# Patient Record
Sex: MALE | Race: WHITE | HISPANIC OR LATINO | Employment: FULL TIME | URBAN - METROPOLITAN AREA
[De-identification: names, ages, dates, MRNs, and addresses within clinical notes are randomized per-mention and may not be internally consistent; named-entity substitution may affect disease eponyms.]

---

## 2017-11-15 ENCOUNTER — HOSPITAL ENCOUNTER (OUTPATIENT)
Dept: LAB | Facility: MEDICAL CENTER | Age: 52
End: 2017-11-15
Payer: COMMERCIAL

## 2017-11-15 LAB
ALBUMIN SERPL BCP-MCNC: 4.3 G/DL (ref 3.2–4.9)
ALBUMIN/GLOB SERPL: 1.6 G/DL
ALP SERPL-CCNC: 68 U/L (ref 30–99)
ALT SERPL-CCNC: 43 U/L (ref 2–50)
ANION GAP SERPL CALC-SCNC: 10 MMOL/L (ref 0–11.9)
AST SERPL-CCNC: 27 U/L (ref 12–45)
BDY FAT % MEASURED: 21.8 %
BILIRUB SERPL-MCNC: 0.4 MG/DL (ref 0.1–1.5)
BP DIAS: 81 MMHG
BP SYS: 136 MMHG
BUN SERPL-MCNC: 17 MG/DL (ref 8–22)
CALCIUM SERPL-MCNC: 9.3 MG/DL (ref 8.5–10.5)
CHLORIDE SERPL-SCNC: 106 MMOL/L (ref 96–112)
CHOLEST SERPL-MCNC: 199 MG/DL (ref 100–199)
CO2 SERPL-SCNC: 23 MMOL/L (ref 20–33)
CREAT SERPL-MCNC: 0.76 MG/DL (ref 0.5–1.4)
DIABETES HTDIA: NO
EVENT NAME HTEVT: NORMAL
GFR SERPL CREATININE-BSD FRML MDRD: >60 ML/MIN/1.73 M 2
GLOBULIN SER CALC-MCNC: 2.7 G/DL (ref 1.9–3.5)
GLUCOSE SERPL-MCNC: 86 MG/DL (ref 65–99)
HDLC SERPL-MCNC: 49 MG/DL
HYPERTENSION HTHYP: NO
LDLC SERPL CALC-MCNC: 101 MG/DL
POTASSIUM SERPL-SCNC: 3.8 MMOL/L (ref 3.6–5.5)
PROT SERPL-MCNC: 7 G/DL (ref 6–8.2)
SCREENING LOC CITY HTCIT: NORMAL
SCREENING LOC STATE HTSTA: NORMAL
SCREENING LOCATION HTLOC: NORMAL
SODIUM SERPL-SCNC: 139 MMOL/L (ref 135–145)
SUBSCRIBER ID HTSID: NORMAL
TRIGL SERPL-MCNC: 245 MG/DL (ref 0–149)

## 2017-11-15 PROCEDURE — S5190 WELLNESS ASSESSMENT BY NONPH: HCPCS

## 2017-11-15 PROCEDURE — 80061 LIPID PANEL: CPT

## 2017-11-15 PROCEDURE — 36415 COLL VENOUS BLD VENIPUNCTURE: CPT

## 2017-11-15 PROCEDURE — 80053 COMPREHEN METABOLIC PANEL: CPT

## 2019-07-25 ENCOUNTER — HOSPITAL ENCOUNTER (INPATIENT)
Facility: MEDICAL CENTER | Age: 54
LOS: 2 days | DRG: 395 | End: 2019-07-27
Attending: EMERGENCY MEDICINE | Admitting: HOSPITALIST

## 2019-07-25 ENCOUNTER — APPOINTMENT (OUTPATIENT)
Dept: RADIOLOGY | Facility: MEDICAL CENTER | Age: 54
DRG: 395 | End: 2019-07-25
Attending: INTERNAL MEDICINE

## 2019-07-25 ENCOUNTER — APPOINTMENT (OUTPATIENT)
Dept: RADIOLOGY | Facility: MEDICAL CENTER | Age: 54
DRG: 395 | End: 2019-07-25
Attending: HOSPITALIST

## 2019-07-25 DIAGNOSIS — R11.2 NAUSEA VOMITING AND DIARRHEA: ICD-10-CM

## 2019-07-25 DIAGNOSIS — R19.7 NAUSEA VOMITING AND DIARRHEA: ICD-10-CM

## 2019-07-25 DIAGNOSIS — E87.6 HYPOKALEMIA: ICD-10-CM

## 2019-07-25 PROBLEM — R53.1 WEAKNESS: Status: ACTIVE | Noted: 2019-07-25

## 2019-07-25 PROBLEM — R11.10 VOMITING: Status: ACTIVE | Noted: 2019-07-25

## 2019-07-25 PROBLEM — E83.39 HYPOPHOSPHATEMIA: Status: ACTIVE | Noted: 2019-07-25

## 2019-07-25 LAB
ALBUMIN SERPL BCP-MCNC: 4.7 G/DL (ref 3.2–4.9)
ALBUMIN/GLOB SERPL: 1.4 G/DL
ALP SERPL-CCNC: 81 U/L (ref 30–99)
ALT SERPL-CCNC: 33 U/L (ref 2–50)
AMPHET UR QL SCN: NEGATIVE
AMPHET UR QL SCN: NEGATIVE
ANION GAP SERPL CALC-SCNC: 12 MMOL/L (ref 0–11.9)
APPEARANCE UR: CLEAR
AST SERPL-CCNC: 29 U/L (ref 12–45)
BARBITURATES UR QL SCN: NEGATIVE
BARBITURATES UR QL SCN: NEGATIVE
BASOPHILS # BLD AUTO: 0.2 % (ref 0–1.8)
BASOPHILS # BLD: 0.02 K/UL (ref 0–0.12)
BENZODIAZ UR QL SCN: NEGATIVE
BENZODIAZ UR QL SCN: NEGATIVE
BILIRUB SERPL-MCNC: 0.7 MG/DL (ref 0.1–1.5)
BILIRUB UR QL STRIP.AUTO: NEGATIVE
BUN SERPL-MCNC: 15 MG/DL (ref 8–22)
BZE UR QL SCN: NEGATIVE
BZE UR QL SCN: NEGATIVE
CALCIUM SERPL-MCNC: 10 MG/DL (ref 8.5–10.5)
CANNABINOIDS UR QL SCN: NEGATIVE
CANNABINOIDS UR QL SCN: NEGATIVE
CHLORIDE SERPL-SCNC: 105 MMOL/L (ref 96–112)
CK SERPL-CCNC: 227 U/L (ref 0–154)
CO2 SERPL-SCNC: 23 MMOL/L (ref 20–33)
COLOR UR: YELLOW
CREAT SERPL-MCNC: 0.94 MG/DL (ref 0.5–1.4)
EKG IMPRESSION: NORMAL
EOSINOPHIL # BLD AUTO: 0 K/UL (ref 0–0.51)
EOSINOPHIL NFR BLD: 0 % (ref 0–6.9)
ERYTHROCYTE [DISTWIDTH] IN BLOOD BY AUTOMATED COUNT: 39.8 FL (ref 35.9–50)
ETHANOL BLD-MCNC: 0 G/DL
GLOBULIN SER CALC-MCNC: 3.3 G/DL (ref 1.9–3.5)
GLUCOSE SERPL-MCNC: 130 MG/DL (ref 65–99)
GLUCOSE UR STRIP.AUTO-MCNC: NEGATIVE MG/DL
HCT VFR BLD AUTO: 49.8 % (ref 42–52)
HGB BLD-MCNC: 17.2 G/DL (ref 14–18)
IMM GRANULOCYTES # BLD AUTO: 0.01 K/UL (ref 0–0.11)
IMM GRANULOCYTES NFR BLD AUTO: 0.1 % (ref 0–0.9)
KETONES UR STRIP.AUTO-MCNC: ABNORMAL MG/DL
LEUKOCYTE ESTERASE UR QL STRIP.AUTO: NEGATIVE
LIPASE SERPL-CCNC: 21 U/L (ref 11–82)
LYMPHOCYTES # BLD AUTO: 0.69 K/UL (ref 1–4.8)
LYMPHOCYTES NFR BLD: 8.3 % (ref 22–41)
MAGNESIUM SERPL-MCNC: 2 MG/DL (ref 1.5–2.5)
MAGNESIUM SERPL-MCNC: 2.3 MG/DL (ref 1.5–2.5)
MCH RBC QN AUTO: 30 PG (ref 27–33)
MCHC RBC AUTO-ENTMCNC: 34.5 G/DL (ref 33.7–35.3)
MCV RBC AUTO: 86.8 FL (ref 81.4–97.8)
METHADONE UR QL SCN: NEGATIVE
METHADONE UR QL SCN: NEGATIVE
MICRO URNS: ABNORMAL
MONOCYTES # BLD AUTO: 0.27 K/UL (ref 0–0.85)
MONOCYTES NFR BLD AUTO: 3.3 % (ref 0–13.4)
NEUTROPHILS # BLD AUTO: 7.31 K/UL (ref 1.82–7.42)
NEUTROPHILS NFR BLD: 88.1 % (ref 44–72)
NITRITE UR QL STRIP.AUTO: NEGATIVE
NRBC # BLD AUTO: 0 K/UL
NRBC BLD-RTO: 0 /100 WBC
OPIATES UR QL SCN: NEGATIVE
OPIATES UR QL SCN: NEGATIVE
OSMOLALITY SERPL: 298 MOSM/KG H2O (ref 278–298)
OSMOLALITY UR: 554 MOSM/KG H2O (ref 300–900)
OXYCODONE UR QL SCN: NEGATIVE
OXYCODONE UR QL SCN: NEGATIVE
PCP UR QL SCN: NEGATIVE
PCP UR QL SCN: NEGATIVE
PH UR STRIP.AUTO: 6 [PH]
PHOSPHATE SERPL-MCNC: 2.2 MG/DL (ref 2.5–4.5)
PLATELET # BLD AUTO: 240 K/UL (ref 164–446)
PMV BLD AUTO: 10 FL (ref 9–12.9)
POTASSIUM SERPL-SCNC: 1.8 MMOL/L (ref 3.6–5.5)
POTASSIUM SERPL-SCNC: 1.9 MMOL/L (ref 3.6–5.5)
POTASSIUM SERPL-SCNC: 2.3 MMOL/L (ref 3.6–5.5)
POTASSIUM SERPL-SCNC: 2.5 MMOL/L (ref 3.6–5.5)
POTASSIUM SERPL-SCNC: 2.7 MMOL/L (ref 3.6–5.5)
POTASSIUM UR-SCNC: 49.6 MMOL/L
PROPOXYPH UR QL SCN: NEGATIVE
PROPOXYPH UR QL SCN: NEGATIVE
PROT SERPL-MCNC: 8 G/DL (ref 6–8.2)
PROT UR QL STRIP: NEGATIVE MG/DL
RBC # BLD AUTO: 5.74 M/UL (ref 4.7–6.1)
RBC UR QL AUTO: NEGATIVE
SODIUM SERPL-SCNC: 140 MMOL/L (ref 135–145)
SP GR UR STRIP.AUTO: 1.01
UROBILINOGEN UR STRIP.AUTO-MCNC: 0.2 MG/DL
WBC # BLD AUTO: 8.3 K/UL (ref 4.8–10.8)

## 2019-07-25 PROCEDURE — 36556 INSERT NON-TUNNEL CV CATH: CPT | Mod: RT | Performed by: INTERNAL MEDICINE

## 2019-07-25 PROCEDURE — 99292 CRITICAL CARE ADDL 30 MIN: CPT | Mod: 25 | Performed by: INTERNAL MEDICINE

## 2019-07-25 PROCEDURE — 700111 HCHG RX REV CODE 636 W/ 250 OVERRIDE (IP): Performed by: HOSPITALIST

## 2019-07-25 PROCEDURE — 51798 US URINE CAPACITY MEASURE: CPT

## 2019-07-25 PROCEDURE — 700105 HCHG RX REV CODE 258: Performed by: HOSPITALIST

## 2019-07-25 PROCEDURE — C1751 CATH, INF, PER/CENT/MIDLINE: HCPCS

## 2019-07-25 PROCEDURE — 82550 ASSAY OF CK (CPK): CPT

## 2019-07-25 PROCEDURE — 84132 ASSAY OF SERUM POTASSIUM: CPT | Mod: 91

## 2019-07-25 PROCEDURE — 70450 CT HEAD/BRAIN W/O DYE: CPT

## 2019-07-25 PROCEDURE — 83690 ASSAY OF LIPASE: CPT

## 2019-07-25 PROCEDURE — 700105 HCHG RX REV CODE 258: Performed by: EMERGENCY MEDICINE

## 2019-07-25 PROCEDURE — 84100 ASSAY OF PHOSPHORUS: CPT

## 2019-07-25 PROCEDURE — 02HV33Z INSERTION OF INFUSION DEVICE INTO SUPERIOR VENA CAVA, PERCUTANEOUS APPROACH: ICD-10-PCS | Performed by: INTERNAL MEDICINE

## 2019-07-25 PROCEDURE — 700101 HCHG RX REV CODE 250: Performed by: HOSPITALIST

## 2019-07-25 PROCEDURE — A9270 NON-COVERED ITEM OR SERVICE: HCPCS | Performed by: EMERGENCY MEDICINE

## 2019-07-25 PROCEDURE — 99291 CRITICAL CARE FIRST HOUR: CPT | Mod: 25 | Performed by: INTERNAL MEDICINE

## 2019-07-25 PROCEDURE — 99285 EMERGENCY DEPT VISIT HI MDM: CPT

## 2019-07-25 PROCEDURE — 81003 URINALYSIS AUTO W/O SCOPE: CPT

## 2019-07-25 PROCEDURE — 85025 COMPLETE CBC W/AUTO DIFF WBC: CPT

## 2019-07-25 PROCEDURE — 700101 HCHG RX REV CODE 250: Performed by: EMERGENCY MEDICINE

## 2019-07-25 PROCEDURE — 99222 1ST HOSP IP/OBS MODERATE 55: CPT | Performed by: HOSPITALIST

## 2019-07-25 PROCEDURE — 96375 TX/PRO/DX INJ NEW DRUG ADDON: CPT

## 2019-07-25 PROCEDURE — 83735 ASSAY OF MAGNESIUM: CPT

## 2019-07-25 PROCEDURE — 700101 HCHG RX REV CODE 250: Performed by: INTERNAL MEDICINE

## 2019-07-25 PROCEDURE — 96365 THER/PROPH/DIAG IV INF INIT: CPT

## 2019-07-25 PROCEDURE — 700102 HCHG RX REV CODE 250 W/ 637 OVERRIDE(OP): Performed by: EMERGENCY MEDICINE

## 2019-07-25 PROCEDURE — 700102 HCHG RX REV CODE 250 W/ 637 OVERRIDE(OP): Performed by: HOSPITALIST

## 2019-07-25 PROCEDURE — 93005 ELECTROCARDIOGRAM TRACING: CPT | Performed by: HOSPITALIST

## 2019-07-25 PROCEDURE — 36556 INSERT NON-TUNNEL CV CATH: CPT

## 2019-07-25 PROCEDURE — 700105 HCHG RX REV CODE 258: Performed by: INTERNAL MEDICINE

## 2019-07-25 PROCEDURE — 71045 X-RAY EXAM CHEST 1 VIEW: CPT

## 2019-07-25 PROCEDURE — 93010 ELECTROCARDIOGRAM REPORT: CPT | Performed by: INTERNAL MEDICINE

## 2019-07-25 PROCEDURE — 80307 DRUG TEST PRSMV CHEM ANLYZR: CPT

## 2019-07-25 PROCEDURE — 96366 THER/PROPH/DIAG IV INF ADDON: CPT

## 2019-07-25 PROCEDURE — 83935 ASSAY OF URINE OSMOLALITY: CPT

## 2019-07-25 PROCEDURE — 770022 HCHG ROOM/CARE - ICU (200)

## 2019-07-25 PROCEDURE — 700111 HCHG RX REV CODE 636 W/ 250 OVERRIDE (IP): Performed by: INTERNAL MEDICINE

## 2019-07-25 PROCEDURE — 80053 COMPREHEN METABOLIC PANEL: CPT

## 2019-07-25 PROCEDURE — B548ZZA ULTRASONOGRAPHY OF SUPERIOR VENA CAVA, GUIDANCE: ICD-10-PCS | Performed by: INTERNAL MEDICINE

## 2019-07-25 PROCEDURE — A9270 NON-COVERED ITEM OR SERVICE: HCPCS | Performed by: HOSPITALIST

## 2019-07-25 PROCEDURE — 96372 THER/PROPH/DIAG INJ SC/IM: CPT

## 2019-07-25 PROCEDURE — 96367 TX/PROPH/DG ADDL SEQ IV INF: CPT

## 2019-07-25 PROCEDURE — 83930 ASSAY OF BLOOD OSMOLALITY: CPT

## 2019-07-25 PROCEDURE — 36415 COLL VENOUS BLD VENIPUNCTURE: CPT

## 2019-07-25 PROCEDURE — 84133 ASSAY OF URINE POTASSIUM: CPT

## 2019-07-25 PROCEDURE — 700111 HCHG RX REV CODE 636 W/ 250 OVERRIDE (IP): Performed by: EMERGENCY MEDICINE

## 2019-07-25 RX ORDER — ACETAMINOPHEN 325 MG/1
650 TABLET ORAL EVERY 6 HOURS PRN
Status: DISCONTINUED | OUTPATIENT
Start: 2019-07-25 | End: 2019-07-27 | Stop reason: HOSPADM

## 2019-07-25 RX ORDER — OXYCODONE HYDROCHLORIDE 5 MG/1
2.5 TABLET ORAL
Status: DISCONTINUED | OUTPATIENT
Start: 2019-07-25 | End: 2019-07-27 | Stop reason: HOSPADM

## 2019-07-25 RX ORDER — SODIUM CHLORIDE 9 MG/ML
INJECTION, SOLUTION INTRAVENOUS CONTINUOUS
Status: DISCONTINUED | OUTPATIENT
Start: 2019-07-25 | End: 2019-07-25

## 2019-07-25 RX ORDER — POTASSIUM CHLORIDE 20 MEQ/1
40 TABLET, EXTENDED RELEASE ORAL ONCE
Status: COMPLETED | OUTPATIENT
Start: 2019-07-25 | End: 2019-07-25

## 2019-07-25 RX ORDER — POTASSIUM CHLORIDE 7.45 MG/ML
10 INJECTION INTRAVENOUS
Status: DISCONTINUED | OUTPATIENT
Start: 2019-07-25 | End: 2019-07-25

## 2019-07-25 RX ORDER — ONDANSETRON 2 MG/ML
4 INJECTION INTRAMUSCULAR; INTRAVENOUS EVERY 4 HOURS PRN
Status: DISCONTINUED | OUTPATIENT
Start: 2019-07-25 | End: 2019-07-27 | Stop reason: HOSPADM

## 2019-07-25 RX ORDER — POTASSIUM CHLORIDE 29.8 MG/ML
40 INJECTION INTRAVENOUS ONCE
Status: COMPLETED | OUTPATIENT
Start: 2019-07-25 | End: 2019-07-26

## 2019-07-25 RX ORDER — POTASSIUM CHLORIDE 14.9 MG/ML
20 INJECTION INTRAVENOUS ONCE
Status: COMPLETED | OUTPATIENT
Start: 2019-07-25 | End: 2019-07-25

## 2019-07-25 RX ORDER — POLYETHYLENE GLYCOL 3350 17 G/17G
1 POWDER, FOR SOLUTION ORAL
Status: DISCONTINUED | OUTPATIENT
Start: 2019-07-25 | End: 2019-07-25

## 2019-07-25 RX ORDER — HYDROMORPHONE HYDROCHLORIDE 1 MG/ML
0.5 INJECTION, SOLUTION INTRAMUSCULAR; INTRAVENOUS; SUBCUTANEOUS ONCE
Status: COMPLETED | OUTPATIENT
Start: 2019-07-25 | End: 2019-07-25

## 2019-07-25 RX ORDER — POTASSIUM CHLORIDE 7.45 MG/ML
10 INJECTION INTRAVENOUS ONCE
Status: COMPLETED | OUTPATIENT
Start: 2019-07-25 | End: 2019-07-25

## 2019-07-25 RX ORDER — SODIUM CHLORIDE AND POTASSIUM CHLORIDE 300; 900 MG/100ML; MG/100ML
INJECTION, SOLUTION INTRAVENOUS CONTINUOUS
Status: DISCONTINUED | OUTPATIENT
Start: 2019-07-25 | End: 2019-07-25

## 2019-07-25 RX ORDER — AMOXICILLIN 250 MG
2 CAPSULE ORAL 2 TIMES DAILY
Status: DISCONTINUED | OUTPATIENT
Start: 2019-07-25 | End: 2019-07-25

## 2019-07-25 RX ORDER — SODIUM CHLORIDE, SODIUM LACTATE, POTASSIUM CHLORIDE, CALCIUM CHLORIDE 600; 310; 30; 20 MG/100ML; MG/100ML; MG/100ML; MG/100ML
INJECTION, SOLUTION INTRAVENOUS CONTINUOUS
Status: DISCONTINUED | OUTPATIENT
Start: 2019-07-25 | End: 2019-07-26

## 2019-07-25 RX ORDER — CLONIDINE HYDROCHLORIDE 0.1 MG/1
0.1 TABLET ORAL EVERY 6 HOURS PRN
Status: DISCONTINUED | OUTPATIENT
Start: 2019-07-25 | End: 2019-07-27 | Stop reason: HOSPADM

## 2019-07-25 RX ORDER — MORPHINE SULFATE 4 MG/ML
2 INJECTION, SOLUTION INTRAMUSCULAR; INTRAVENOUS
Status: DISCONTINUED | OUTPATIENT
Start: 2019-07-25 | End: 2019-07-27 | Stop reason: HOSPADM

## 2019-07-25 RX ORDER — PROMETHAZINE HYDROCHLORIDE 25 MG/1
12.5-25 TABLET ORAL EVERY 4 HOURS PRN
Status: DISCONTINUED | OUTPATIENT
Start: 2019-07-25 | End: 2019-07-27 | Stop reason: HOSPADM

## 2019-07-25 RX ORDER — ONDANSETRON 4 MG/1
4 TABLET, ORALLY DISINTEGRATING ORAL EVERY 4 HOURS PRN
Status: DISCONTINUED | OUTPATIENT
Start: 2019-07-25 | End: 2019-07-27 | Stop reason: HOSPADM

## 2019-07-25 RX ORDER — ONDANSETRON 2 MG/ML
4 INJECTION INTRAMUSCULAR; INTRAVENOUS ONCE
Status: COMPLETED | OUTPATIENT
Start: 2019-07-25 | End: 2019-07-25

## 2019-07-25 RX ORDER — OXYCODONE HYDROCHLORIDE 5 MG/1
5 TABLET ORAL
Status: DISCONTINUED | OUTPATIENT
Start: 2019-07-25 | End: 2019-07-27 | Stop reason: HOSPADM

## 2019-07-25 RX ORDER — PROMETHAZINE HYDROCHLORIDE 25 MG/1
12.5-25 SUPPOSITORY RECTAL EVERY 4 HOURS PRN
Status: DISCONTINUED | OUTPATIENT
Start: 2019-07-25 | End: 2019-07-27 | Stop reason: HOSPADM

## 2019-07-25 RX ORDER — BISACODYL 10 MG
10 SUPPOSITORY, RECTAL RECTAL
Status: DISCONTINUED | OUTPATIENT
Start: 2019-07-25 | End: 2019-07-25

## 2019-07-25 RX ORDER — SODIUM CHLORIDE, SODIUM LACTATE, POTASSIUM CHLORIDE, CALCIUM CHLORIDE 600; 310; 30; 20 MG/100ML; MG/100ML; MG/100ML; MG/100ML
INJECTION, SOLUTION INTRAVENOUS CONTINUOUS
Status: DISCONTINUED | OUTPATIENT
Start: 2019-07-25 | End: 2019-07-25

## 2019-07-25 RX ORDER — SODIUM CHLORIDE 9 MG/ML
1000 INJECTION, SOLUTION INTRAVENOUS ONCE
Status: COMPLETED | OUTPATIENT
Start: 2019-07-25 | End: 2019-07-25

## 2019-07-25 RX ORDER — POTASSIUM CHLORIDE 7.45 MG/ML
10 INJECTION INTRAVENOUS
Status: COMPLETED | OUTPATIENT
Start: 2019-07-25 | End: 2019-07-25

## 2019-07-25 RX ADMIN — POTASSIUM CHLORIDE 10 MEQ: 7.46 INJECTION, SOLUTION INTRAVENOUS at 07:20

## 2019-07-25 RX ADMIN — POTASSIUM CHLORIDE AND SODIUM CHLORIDE: 900; 300 INJECTION, SOLUTION INTRAVENOUS at 14:36

## 2019-07-25 RX ADMIN — POTASSIUM CHLORIDE 10 MEQ: 7.46 INJECTION, SOLUTION INTRAVENOUS at 09:17

## 2019-07-25 RX ADMIN — POTASSIUM CHLORIDE 10 MEQ: 7.46 INJECTION, SOLUTION INTRAVENOUS at 10:35

## 2019-07-25 RX ADMIN — POTASSIUM CHLORIDE 20 MEQ: 200 INJECTION, SOLUTION INTRAVENOUS at 17:49

## 2019-07-25 RX ADMIN — POTASSIUM CHLORIDE 10 MEQ: 7.46 INJECTION, SOLUTION INTRAVENOUS at 16:45

## 2019-07-25 RX ADMIN — SODIUM CHLORIDE, POTASSIUM CHLORIDE, SODIUM LACTATE AND CALCIUM CHLORIDE: 600; 310; 30; 20 INJECTION, SOLUTION INTRAVENOUS at 07:28

## 2019-07-25 RX ADMIN — POTASSIUM CHLORIDE 10 MEQ: 7.46 INJECTION, SOLUTION INTRAVENOUS at 15:44

## 2019-07-25 RX ADMIN — SODIUM CHLORIDE 1000 ML: 9 INJECTION, SOLUTION INTRAVENOUS at 04:46

## 2019-07-25 RX ADMIN — POTASSIUM CHLORIDE 40 MEQ: 1500 TABLET, EXTENDED RELEASE ORAL at 08:16

## 2019-07-25 RX ADMIN — SODIUM CHLORIDE: 9 INJECTION, SOLUTION INTRAVENOUS at 08:17

## 2019-07-25 RX ADMIN — POTASSIUM CHLORIDE 10 MEQ: 7.46 INJECTION, SOLUTION INTRAVENOUS at 08:16

## 2019-07-25 RX ADMIN — HYDROMORPHONE HYDROCHLORIDE 0.5 MG: 1 INJECTION, SOLUTION INTRAMUSCULAR; INTRAVENOUS; SUBCUTANEOUS at 06:11

## 2019-07-25 RX ADMIN — POTASSIUM PHOSPHATE, MONOBASIC AND POTASSIUM PHOSPHATE, DIBASIC 15 MMOL: 224; 236 INJECTION, SOLUTION, CONCENTRATE INTRAVENOUS at 19:24

## 2019-07-25 RX ADMIN — ENOXAPARIN SODIUM 40 MG: 100 INJECTION SUBCUTANEOUS at 07:20

## 2019-07-25 RX ADMIN — POTASSIUM CHLORIDE 10 MEQ: 7.46 INJECTION, SOLUTION INTRAVENOUS at 14:35

## 2019-07-25 RX ADMIN — POTASSIUM CHLORIDE 40 MEQ: 29.8 INJECTION, SOLUTION INTRAVENOUS at 20:23

## 2019-07-25 RX ADMIN — ONDANSETRON 4 MG: 2 INJECTION INTRAMUSCULAR; INTRAVENOUS at 04:40

## 2019-07-25 RX ADMIN — POTASSIUM CHLORIDE AND SODIUM CHLORIDE: 900; 300 INJECTION, SOLUTION INTRAVENOUS at 05:17

## 2019-07-25 RX ADMIN — POTASSIUM CHLORIDE 40 MEQ: 1500 TABLET, EXTENDED RELEASE ORAL at 05:14

## 2019-07-25 RX ADMIN — SODIUM CHLORIDE, POTASSIUM CHLORIDE, SODIUM LACTATE AND CALCIUM CHLORIDE: 600; 310; 30; 20 INJECTION, SOLUTION INTRAVENOUS at 19:14

## 2019-07-25 ASSESSMENT — ENCOUNTER SYMPTOMS
CONSTITUTIONAL NEGATIVE: 1
STRIDOR: 0
FEVER: 0
PALPITATIONS: 0
PHOTOPHOBIA: 0
HEMOPTYSIS: 0
WEAKNESS: 1
COUGH: 0
CARDIOVASCULAR NEGATIVE: 1
HALLUCINATIONS: 0
EYES NEGATIVE: 1
HEARTBURN: 0
SINUS PAIN: 0
MUSCULOSKELETAL NEGATIVE: 1
DEPRESSION: 0
ORTHOPNEA: 0
DIARRHEA: 1
DIZZINESS: 0
NERVOUS/ANXIOUS: 0
MYALGIAS: 0
LOSS OF CONSCIOUSNESS: 0
FOCAL WEAKNESS: 1
NECK PAIN: 0
VOMITING: 1
PSYCHIATRIC NEGATIVE: 1
RESPIRATORY NEGATIVE: 1
SENSORY CHANGE: 1
BRUISES/BLEEDS EASILY: 0
HEADACHES: 0
NAUSEA: 1
BACK PAIN: 0
CHILLS: 0
BLOOD IN STOOL: 0
DOUBLE VISION: 0
BLURRED VISION: 0
VOMITING: 0
NAUSEA: 0
DIAPHORESIS: 0
SHORTNESS OF BREATH: 1
SEIZURES: 0
TINGLING: 0

## 2019-07-25 ASSESSMENT — COGNITIVE AND FUNCTIONAL STATUS - GENERAL
DAILY ACTIVITIY SCORE: 6
SUGGESTED CMS G CODE MODIFIER DAILY ACTIVITY: CN
WALKING IN HOSPITAL ROOM: TOTAL
TURNING FROM BACK TO SIDE WHILE IN FLAT BAD: UNABLE
SUGGESTED CMS G CODE MODIFIER MOBILITY: CN
EATING MEALS: TOTAL
DRESSING REGULAR LOWER BODY CLOTHING: TOTAL
HELP NEEDED FOR BATHING: TOTAL
MOVING TO AND FROM BED TO CHAIR: UNABLE
MOBILITY SCORE: 6
STANDING UP FROM CHAIR USING ARMS: TOTAL
CLIMB 3 TO 5 STEPS WITH RAILING: TOTAL
DRESSING REGULAR UPPER BODY CLOTHING: TOTAL
MOVING FROM LYING ON BACK TO SITTING ON SIDE OF FLAT BED: UNABLE
PERSONAL GROOMING: TOTAL
TOILETING: TOTAL

## 2019-07-25 ASSESSMENT — PATIENT HEALTH QUESTIONNAIRE - PHQ9
SUM OF ALL RESPONSES TO PHQ9 QUESTIONS 1 AND 2: 0
1. LITTLE INTEREST OR PLEASURE IN DOING THINGS: NOT AT ALL
2. FEELING DOWN, DEPRESSED, IRRITABLE, OR HOPELESS: NOT AT ALL
2. FEELING DOWN, DEPRESSED, IRRITABLE, OR HOPELESS: NOT AT ALL
SUM OF ALL RESPONSES TO PHQ9 QUESTIONS 1 AND 2: 0
1. LITTLE INTEREST OR PLEASURE IN DOING THINGS: NOT AT ALL

## 2019-07-25 ASSESSMENT — LIFESTYLE VARIABLES
TOTAL SCORE: 1
ALCOHOL_USE: YES
SUBSTANCE_ABUSE: 0
EVER_SMOKED: YES
ON A TYPICAL DAY WHEN YOU DRINK ALCOHOL HOW MANY DRINKS DO YOU HAVE: 3
HOW MANY TIMES IN THE PAST YEAR HAVE YOU HAD 5 OR MORE DRINKS IN A DAY: 5
EVER FELT BAD OR GUILTY ABOUT YOUR DRINKING: NO
TOTAL SCORE: 1
HAVE PEOPLE ANNOYED YOU BY CRITICIZING YOUR DRINKING: NO
TOTAL SCORE: 1
HAVE YOU EVER FELT YOU SHOULD CUT DOWN ON YOUR DRINKING: NO
EVER HAD A DRINK FIRST THING IN THE MORNING TO STEADY YOUR NERVES TO GET RID OF A HANGOVER: YES
AVERAGE NUMBER OF DAYS PER WEEK YOU HAVE A DRINK CONTAINING ALCOHOL: 2
CONSUMPTION TOTAL: POSITIVE

## 2019-07-25 ASSESSMENT — COPD QUESTIONNAIRES
DURING THE PAST 4 WEEKS HOW MUCH DID YOU FEEL SHORT OF BREATH: NONE/LITTLE OF THE TIME
HAVE YOU SMOKED AT LEAST 100 CIGARETTES IN YOUR ENTIRE LIFE: YES
DO YOU EVER COUGH UP ANY MUCUS OR PHLEGM?: NO/ONLY WITH OCCASIONAL COLDS OR INFECTIONS
COPD SCREENING SCORE: 3
IN THE PAST 12 MONTHS DO YOU DO LESS THAN YOU USED TO BECAUSE OF YOUR BREATHING PROBLEMS: DISAGREE/UNSURE

## 2019-07-25 NOTE — PROGRESS NOTES
2 RN skin check complete with Rey CORDERO.   Devices in place none.  Skin assessed under devices n/a.  Confirmed pressure ulcers found on n/a.  New potential pressure ulcers noted on none. Wound consult placed n/a.  The following interventions in place frequent repositioning, pillows in place to float heels/elbows.     Generalized dryness. Otherwise skin intact.

## 2019-07-25 NOTE — ED TRIAGE NOTES
"Francisco Mahoney   53 y.o. male   Chief Complaint   Patient presents with   • Nausea/Vomiting/Diarrhea     drank some instant coffe last night around 2100 and began to have N/V/D within an hour, says he has had multiple episodes of each all night and now is having abd pain      Pt to triage in WC with wife for above complaint. Pt says he is now too weak from being dehydrated to be able to walk. Visibly uncomfortable.     Pt is alert and oriented, speaking in full sentences, follows commands and responds appropriately to questions. Resp are even and unlabored.   Pt placed in lobby. Pt educated on triage process. Pt encouraged to alert staff for any changes.    /80   Pulse 61   Temp 36.2 °C (97.1 °F) (Temporal)   Resp 18   Ht 1.753 m (5' 9\")   Wt 83.9 kg (185 lb)   SpO2 98%   BMI 27.32 kg/m²     "

## 2019-07-25 NOTE — PROGRESS NOTES
Called spouse to question about patient mobility. Spouse stated patient was able to walk but slowly and did c/o arm weakness but was able to move arms.

## 2019-07-25 NOTE — H&P
Hospital Medicine History & Physical Note    Date of Service  7/25/2019    Primary Care Physician  Pcp Pt States None    Consultants  None    Code Status  Full code    Chief Complaint  Vomiting and diarrhea    History of Presenting Illness  53 y.o. male with no prior medical history, was in his usual state of health until 7 PM on the day prior to admission.  He reports having eggs with meat.  He felt well after this with no issue, and then drank a packet of strong coffee.  He states that it may not have been coffee however, because approximately 15 minutes after ingesting it, he began to have severe nausea, associated with multiple bouts of vomiting, followed by multiple bouts of diarrhea.  He denies any significant abdominal pain, but continues to have both nausea, vomiting and diarrhea.  He denies fever or chills, chest pain or shortness of breath.  After multiple episodes of nausea and vomiting, as well as diarrhea, he began to have tingling of his upper and lower extremities as well as weakness of his upper extremities.    Review of Systems  Review of Systems   Constitutional: Negative.    HENT: Negative.    Eyes: Negative.    Respiratory: Negative.    Cardiovascular: Negative.    Gastrointestinal: Positive for diarrhea, nausea and vomiting.   Genitourinary: Negative.    Musculoskeletal: Negative.    Skin: Negative.    Neurological: Positive for sensory change and focal weakness.   Endo/Heme/Allergies: Negative.    Psychiatric/Behavioral: Negative.        Past Medical History   has no past medical history on file.    Surgical History   has no past surgical history on file.     Family History  family history includes No Known Problems in his father and mother. Reviewed and non-contributory     Social History   reports that he has never smoked. He has never used smokeless tobacco. He reports that he does not drink alcohol or use drugs.    Allergies  No Known Allergies    Medications  None       Physical  Exam  Temp:  [36.2 °C (97.1 °F)] 36.2 °C (97.1 °F)  Pulse:  [58-78] 78  Resp:  [17-20] 20  BP: (134)/(80) 134/80  SpO2:  [94 %-98 %] 94 %    Physical Exam   Constitutional: He is oriented to person, place, and time. He appears well-developed and well-nourished. No distress.   HENT:   Head: Normocephalic and atraumatic.   Eyes: Pupils are equal, round, and reactive to light. Conjunctivae are normal.   Neck: Normal range of motion. Neck supple. No tracheal deviation present. No thyromegaly present.   Cardiovascular: Normal rate, regular rhythm and normal heart sounds.  Exam reveals no gallop and no friction rub.    No murmur heard.  Pulmonary/Chest: Effort normal and breath sounds normal. No respiratory distress. He has no wheezes.   Abdominal: Soft. Bowel sounds are normal. He exhibits no distension and no mass. There is no tenderness. There is no rebound and no guarding.   Musculoskeletal: Normal range of motion. He exhibits no edema.   Lymphadenopathy:     He has no cervical adenopathy.   Neurological: He is alert and oriented to person, place, and time. No cranial nerve deficit.   4.5 out of 5 upper extremity strength   Skin: Skin is warm and dry. He is not diaphoretic.   Psychiatric: He has a normal mood and affect.   Nursing note and vitals reviewed.      Laboratory:  Recent Labs      07/25/19   0407   WBC  8.3   RBC  5.74   HEMOGLOBIN  17.2   HEMATOCRIT  49.8   MCV  86.8   MCH  30.0   MCHC  34.5   RDW  39.8   PLATELETCT  240   MPV  10.0     Recent Labs      07/25/19   0407   SODIUM  140   POTASSIUM  2.5*   CHLORIDE  105   CO2  23   GLUCOSE  130*   BUN  15   CREATININE  0.94   CALCIUM  10.0     Recent Labs      07/25/19   0407   ALTSGPT  33   ASTSGOT  29   ALKPHOSPHAT  81   TBILIRUBIN  0.7   LIPASE  21   GLUCOSE  130*         No results for input(s): NTPROBNP in the last 72 hours.      No results for input(s): TROPONINT in the last 72 hours.    Urinalysis:    No results found     Imaging:  No orders to display          Assessment/Plan:  I anticipate this patient will require at least two midnights for appropriate medical management, necessitating inpatient admission.    * Hypokalemia   Assessment & Plan    Suspect due to GI losses, with no prior history of the same.  Does have significant weakness, as well as numbness and tingling in the extremities.   Plan for replacement.  Will need telemetry monitoring as well.       Diarrhea   Assessment & Plan    Suspect due to ingestion of very strong coffee, apparently a mexican food product.  Supportive care.      Vomiting   Assessment & Plan    Suspect due to coffee ingestion.           VTE prophylaxis: SCD, lovenox

## 2019-07-25 NOTE — ED NOTES
Tele RN at bedside for transfer of pt to T8-1, report given.  All belongings with pt on transfer.  Repeat K+ 2.3, Dr Low paged.

## 2019-07-25 NOTE — ED NOTES
Tech from Lab called with critical result of K+ 2.5 at 505. Critical lab result read back to tech.   Dr. Rutledge notified of critical lab result at 505.  Critical lab result read back by Dr. Rutledge.

## 2019-07-25 NOTE — PROGRESS NOTES
Attending Hospitalist today is Dr. Turner starting at 7am.  Please call this physician for orders, updates, or questions.

## 2019-07-25 NOTE — ASSESSMENT & PLAN NOTE
Suspect due to ingestion of very strong coffee, apparently a mexican food product, diarrhea is resolved.  No history of blood in the stool or fevers or abdominal pain.

## 2019-07-25 NOTE — PROGRESS NOTES
Patient c/o not being able to move both upper and lower extremities. Neuro check complete assessment. Patient seems very agitated. Notified MD of findings, new orders received. Will continue to monitor.

## 2019-07-25 NOTE — ED NOTES
"Pt wheeled to hospital room in wheelchair. Pt states he drank a packet of coffee mix and felt sick an hour later. Pt states he has LUQ pain like \"something is stuck in there\". Pt exhibiting n/v at the moment. Pt aaox4, hooked to monitor, denies CP, resting on room air, erp to see.   "

## 2019-07-25 NOTE — ED PROVIDER NOTES
ED Provider Note    Scribed for Leroy Rutledge M.D. by Viki Gamez. 7/25/2019  4:11 AM    Primary care provider: None  Means of arrival: Walk in  History obtained from: Patient  History limited by: None    CHIEF COMPLAINT  •  Vomiting   •  Diarrhea    HPI  Francisco Mahoney is a 53 y.o. male who presents to the Emergency Department for vomiting and diarrhea with an onset of 1 day. The patient began to experience multiple episodes of vomiting and diarrhea at approximately 9:30 PM yesterday evening one hour after drinking instant coffee. Emesis and diarrhea are non-bloody and have occurred multiple times per hour. He complains of generalized abdominal pain, generalized weakness and numbness to his fingertips. No alleviating or exacerbating factors were reported. Negative for fevers, chills, chest pain, palpitations or urinary complaints. No prior history of experiencing similar symptoms. No abdominal surgeries in the past.      REVIEW OF SYSTEMS  Pertinent positives include vomiting, diarrhea, generalized abdominal pain, generalized weakness and numbness to his fingertips.   Pertinent negatives include no fevers, chills, chest pain, palpitations or urinary complaints.    All other systems reviewed and negative. See HPI for further details.       PAST MEDICAL HISTORY  No prior history of Crohn's or irritable bowel disease.      SURGICAL HISTORY  Patient denies any abdominal surgeries.       SOCIAL HISTORY  Social History   Substance Use Topics   • Smoking status: Never Smoker   • Smokeless tobacco: Never Used   • Alcohol use No      History   Drug Use No       FAMILY HISTORY  History reviewed. No pertinent family history.       CURRENT MEDICATIONS  Home Medications     Reviewed by Rocky Shankar R.N. (Registered Nurse) on 07/25/19 at 0341  Med List Status: <None>   Medication Last Dose Status        Patient Graeme Taking any Medications                       ALLERGIES  None      PHYSICAL EXAM  VITAL SIGNS:  "/80   Pulse 61   Temp 36.2 °C (97.1 °F) (Temporal)   Resp 18   Ht 1.753 m (5' 9\")   Wt 83.9 kg (185 lb)   SpO2 98%   BMI 27.32 kg/m²     Nursing note and vitals reviewed.    Constitutional: Well-developed and well-nourished. Moderate distress secondary to nausea.   HENT: Head is normocephalic and atraumatic. Oropharynx is clear without exudate or erythema. Dry mucous membranes.  Eyes: Pupils are equal, round, and reactive to light. Conjunctiva are normal.   Cardiovascular: Normal rate and regular rhythm. No murmur heard. Normal radial pulses.  Pulmonary/Chest: Breath sounds normal. No wheezes or rales.   Abdominal: Soft and non-tender. No distention    Musculoskeletal: Extremities exhibit normal range of motion without edema or tenderness.   Neurological: Awake, alert and oriented to person, place, and time. No focal deficits noted.  Skin: Pale, Skin is warm and dry. No rash.   Psychiatric: Normal mood and affect. Appropriate for clinical situation.      DIAGNOSTIC STUDIES / PROCEDURES    EKG Interpretation  Interpreted by me as below    LABS  Results for orders placed or performed during the hospital encounter of 07/25/19   CBC WITH DIFFERENTIAL   Result Value Ref Range    WBC 8.3 4.8 - 10.8 K/uL    RBC 5.74 4.70 - 6.10 M/uL    Hemoglobin 17.2 14.0 - 18.0 g/dL    Hematocrit 49.8 42.0 - 52.0 %    MCV 86.8 81.4 - 97.8 fL    MCH 30.0 27.0 - 33.0 pg    MCHC 34.5 33.7 - 35.3 g/dL    RDW 39.8 35.9 - 50.0 fL    Platelet Count 240 164 - 446 K/uL    MPV 10.0 9.0 - 12.9 fL    Neutrophils-Polys 88.10 (H) 44.00 - 72.00 %    Lymphocytes 8.30 (L) 22.00 - 41.00 %    Monocytes 3.30 0.00 - 13.40 %    Eosinophils 0.00 0.00 - 6.90 %    Basophils 0.20 0.00 - 1.80 %    Immature Granulocytes 0.10 0.00 - 0.90 %    Nucleated RBC 0.00 /100 WBC    Neutrophils (Absolute) 7.31 1.82 - 7.42 K/uL    Lymphs (Absolute) 0.69 (L) 1.00 - 4.80 K/uL    Monos (Absolute) 0.27 0.00 - 0.85 K/uL    Eos (Absolute) 0.00 0.00 - 0.51 K/uL    Baso " (Absolute) 0.02 0.00 - 0.12 K/uL    Immature Granulocytes (abs) 0.01 0.00 - 0.11 K/uL    NRBC (Absolute) 0.00 K/uL   COMP METABOLIC PANEL   Result Value Ref Range    Sodium 140 135 - 145 mmol/L    Potassium 2.5 (LL) 3.6 - 5.5 mmol/L    Chloride 105 96 - 112 mmol/L    Co2 23 20 - 33 mmol/L    Anion Gap 12.0 (H) 0.0 - 11.9    Glucose 130 (H) 65 - 99 mg/dL    Bun 15 8 - 22 mg/dL    Creatinine 0.94 0.50 - 1.40 mg/dL    Calcium 10.0 8.5 - 10.5 mg/dL    AST(SGOT) 29 12 - 45 U/L    ALT(SGPT) 33 2 - 50 U/L    Alkaline Phosphatase 81 30 - 99 U/L    Total Bilirubin 0.7 0.1 - 1.5 mg/dL    Albumin 4.7 3.2 - 4.9 g/dL    Total Protein 8.0 6.0 - 8.2 g/dL    Globulin 3.3 1.9 - 3.5 g/dL    A-G Ratio 1.4 g/dL   LIPASE   Result Value Ref Range    Lipase 21 11 - 82 U/L   ESTIMATED GFR   Result Value Ref Range    GFR If African American >60 >60 mL/min/1.73 m 2    GFR If Non African American >60 >60 mL/min/1.73 m 2      All labs reviewed by me.      COURSE & MEDICAL DECISION MAKING  Pertinent Labs & Imaging studies reviewed. (See chart for details)    Differential Diagnoses include but are not limited to: dehydration, electrolyte abnormality, gastroenteritis or pancreatitis.     4:12 AM Obtained and reviewed patient's electronic medical records which indicate no prior ED visits.    4:20 AM Patient seen and examined at bedside for vomiting and diarrhea.      Initial orders in the Emergency Department included laboratory testing: CBC with differential, CMP, estimated GFR, lipase and UA.  Initial treatment in the Emergency Department included 4 mg of Zofran IV. Patient will be resuscitated with intravenous fluids for dehydration and dry mucous membranes.    5:05 AM Critical lab report was received. Hypokalemia with a potassium of 2.5. Patient will be treated with KCl 40 mEq IV and Kdur 40 mEq PO.     5:10 AM Additional labs were ordered: magnesium and phosphorus.     5:12 AM Spoke with Dr. Low, Hospitalist, regarding the patient's  presenting symptoms and diagnostic results. They will consult and admit the patient for further evaluation and care.     5:30 AM On repeat evaluation, patient was updated on the plan for admission. He feels better hydrated after IV fluids. Labs were reviewed with him. Vital signs stable.       HYDRATION: Based on the patient's presentation of Acute Diarrhea, Acute Vomiting and Dehydration the patient was given IV fluids. IV Hydration was used because oral hydration was not as rapid as required. Upon recheck following hydration, the patient was reevaluated and moist mucous membranes. Vital signs stable. Labs will be repeated during admission..      Decision Making:  This is a 53 y.o. male that presents with vomiting, diarrhea and abdominal pain since yesterday evening. Laboratory testing was reviewed and remarkable for hypokalemia with a potassium of 2.5. Intravenous and oral potassium was administered. WBC is normal. No anemia. Lipase, liver and kidney function are normal. Anion gap of 12.0. Glucose is elevated at 130. He was resuscitated with intravenous fluids as noted above. He was admitted to medicine for further evaluation and care.    CRITICAL CARE  I provided critical care services, which included medication orders, frequent reevaluations of the patient's condition and response to treatment, ordering and reviewing test results, and discussing the case with various consultants.  The critical care time associated with the care of the patient was 35 minutes. Review chart for interventions. This time is exclusive of any other billable procedures.          DISPOSITION  Patient will be admitted to Dr. Low, Hospitalist, in guarded condition.       DIAGNOSIS  1. Nausea vomiting and diarrhea    2. Hypokalemia           Viki OLSON ([place]), am scribing for, and in the presence of, Leroy Rutledge M.D.    Electronically signed by: Viki Gamez ([place]), 7/25/2019    Leroy OLSON M.D. personally  performed the services described in this documentation, as scribed by Viki Gamez in my presence, and it is both accurate and complete. C.     The note accurately reflects work and decisions made by me.  Leroy Rutledge  7/25/2019  11:03 AM

## 2019-07-25 NOTE — PROGRESS NOTES
Report given patient being transferred to [facility]

## 2019-07-25 NOTE — ASSESSMENT & PLAN NOTE
Severe hypokalemia not responding to IV or oral supplementation, and also causing severe weakness bilateral upper and lower extremities, patient will require probably more aggressive potassium supplementation probably through central line and also close monitoring, patient is going to be transferred to ICU I have discussed case with ICU team hospitalist and critical care physician, I also discussed case with patient's nurse, discussed findings with patient family and patient.

## 2019-07-25 NOTE — ED NOTES
from Lab called with critical result of Potassium 2.3 at 0741. Critical lab result read back to .   Dr. Ferris notified of critical lab result at 865.  Critical lab result read back by Dr. Ferris.

## 2019-07-26 LAB
ANION GAP SERPL CALC-SCNC: 6 MMOL/L (ref 0–11.9)
BASOPHILS # BLD AUTO: 0.4 % (ref 0–1.8)
BASOPHILS # BLD: 0.03 K/UL (ref 0–0.12)
BUN SERPL-MCNC: 14 MG/DL (ref 8–22)
CALCIUM SERPL-MCNC: 8.2 MG/DL (ref 8.5–10.5)
CHLORIDE SERPL-SCNC: 112 MMOL/L (ref 96–112)
CO2 SERPL-SCNC: 22 MMOL/L (ref 20–33)
CREAT SERPL-MCNC: 0.83 MG/DL (ref 0.5–1.4)
EOSINOPHIL # BLD AUTO: 0.03 K/UL (ref 0–0.51)
EOSINOPHIL NFR BLD: 0.4 % (ref 0–6.9)
ERYTHROCYTE [DISTWIDTH] IN BLOOD BY AUTOMATED COUNT: 43 FL (ref 35.9–50)
GLUCOSE SERPL-MCNC: 110 MG/DL (ref 65–99)
HCT VFR BLD AUTO: 39.4 % (ref 42–52)
HGB BLD-MCNC: 13.9 G/DL (ref 14–18)
IMM GRANULOCYTES # BLD AUTO: 0.02 K/UL (ref 0–0.11)
IMM GRANULOCYTES NFR BLD AUTO: 0.2 % (ref 0–0.9)
LYMPHOCYTES # BLD AUTO: 2.25 K/UL (ref 1–4.8)
LYMPHOCYTES NFR BLD: 26.8 % (ref 22–41)
MAGNESIUM SERPL-MCNC: 1.9 MG/DL (ref 1.5–2.5)
MCH RBC QN AUTO: 31.2 PG (ref 27–33)
MCHC RBC AUTO-ENTMCNC: 35.3 G/DL (ref 33.7–35.3)
MCV RBC AUTO: 88.5 FL (ref 81.4–97.8)
MONOCYTES # BLD AUTO: 0.61 K/UL (ref 0–0.85)
MONOCYTES NFR BLD AUTO: 7.3 % (ref 0–13.4)
NEUTROPHILS # BLD AUTO: 5.45 K/UL (ref 1.82–7.42)
NEUTROPHILS NFR BLD: 64.9 % (ref 44–72)
NRBC # BLD AUTO: 0 K/UL
NRBC BLD-RTO: 0 /100 WBC
PHOSPHATE SERPL-MCNC: 1.9 MG/DL (ref 2.5–4.5)
PLATELET # BLD AUTO: 203 K/UL (ref 164–446)
PMV BLD AUTO: 10.4 FL (ref 9–12.9)
POTASSIUM SERPL-SCNC: 3.5 MMOL/L (ref 3.6–5.5)
POTASSIUM SERPL-SCNC: 4.2 MMOL/L (ref 3.6–5.5)
POTASSIUM SERPL-SCNC: 4.3 MMOL/L (ref 3.6–5.5)
RBC # BLD AUTO: 4.45 M/UL (ref 4.7–6.1)
SODIUM SERPL-SCNC: 140 MMOL/L (ref 135–145)
WBC # BLD AUTO: 8.4 K/UL (ref 4.8–10.8)

## 2019-07-26 PROCEDURE — 700101 HCHG RX REV CODE 250: Performed by: INTERNAL MEDICINE

## 2019-07-26 PROCEDURE — 770020 HCHG ROOM/CARE - TELE (206)

## 2019-07-26 PROCEDURE — 700111 HCHG RX REV CODE 636 W/ 250 OVERRIDE (IP): Performed by: HOSPITALIST

## 2019-07-26 PROCEDURE — 99233 SBSQ HOSP IP/OBS HIGH 50: CPT | Performed by: INTERNAL MEDICINE

## 2019-07-26 PROCEDURE — 84132 ASSAY OF SERUM POTASSIUM: CPT | Mod: 91

## 2019-07-26 PROCEDURE — 83735 ASSAY OF MAGNESIUM: CPT

## 2019-07-26 PROCEDURE — 84100 ASSAY OF PHOSPHORUS: CPT

## 2019-07-26 PROCEDURE — 80048 BASIC METABOLIC PNL TOTAL CA: CPT

## 2019-07-26 PROCEDURE — 700111 HCHG RX REV CODE 636 W/ 250 OVERRIDE (IP): Performed by: INTERNAL MEDICINE

## 2019-07-26 PROCEDURE — 700105 HCHG RX REV CODE 258: Performed by: INTERNAL MEDICINE

## 2019-07-26 PROCEDURE — 85025 COMPLETE CBC W/AUTO DIFF WBC: CPT

## 2019-07-26 RX ORDER — POTASSIUM CHLORIDE 14.9 MG/ML
20 INJECTION INTRAVENOUS ONCE
Status: COMPLETED | OUTPATIENT
Start: 2019-07-26 | End: 2019-07-26

## 2019-07-26 RX ORDER — MAGNESIUM SULFATE HEPTAHYDRATE 40 MG/ML
2 INJECTION, SOLUTION INTRAVENOUS ONCE
Status: COMPLETED | OUTPATIENT
Start: 2019-07-26 | End: 2019-07-26

## 2019-07-26 RX ORDER — POTASSIUM CHLORIDE 7.45 MG/ML
10 INJECTION INTRAVENOUS ONCE
Status: DISCONTINUED | OUTPATIENT
Start: 2019-07-26 | End: 2019-07-26

## 2019-07-26 RX ORDER — POTASSIUM CHLORIDE 7.45 MG/ML
10 INJECTION INTRAVENOUS ONCE
Status: COMPLETED | OUTPATIENT
Start: 2019-07-26 | End: 2019-07-26

## 2019-07-26 RX ADMIN — SODIUM PHOSPHATE, MONOBASIC, MONOHYDRATE AND SODIUM PHOSPHATE, DIBASIC, ANHYDROUS 30 MMOL: 276; 142 INJECTION, SOLUTION INTRAVENOUS at 09:15

## 2019-07-26 RX ADMIN — POTASSIUM CHLORIDE 20 MEQ: 200 INJECTION, SOLUTION INTRAVENOUS at 03:32

## 2019-07-26 RX ADMIN — MAGNESIUM SULFATE IN WATER 2 G: 40 INJECTION, SOLUTION INTRAVENOUS at 09:07

## 2019-07-26 RX ADMIN — SODIUM CHLORIDE, POTASSIUM CHLORIDE, SODIUM LACTATE AND CALCIUM CHLORIDE: 600; 310; 30; 20 INJECTION, SOLUTION INTRAVENOUS at 05:23

## 2019-07-26 RX ADMIN — POTASSIUM CHLORIDE 10 MEQ: 7.46 INJECTION, SOLUTION INTRAVENOUS at 09:11

## 2019-07-26 RX ADMIN — ENOXAPARIN SODIUM 40 MG: 100 INJECTION SUBCUTANEOUS at 06:44

## 2019-07-26 ASSESSMENT — ENCOUNTER SYMPTOMS
BRUISES/BLEEDS EASILY: 0
HEMOPTYSIS: 0
FEVER: 0
MYALGIAS: 0
LOSS OF CONSCIOUSNESS: 0
SHORTNESS OF BREATH: 0
BLURRED VISION: 0
PALPITATIONS: 0
BLOOD IN STOOL: 0
HEADACHES: 0
VOMITING: 0
PHOTOPHOBIA: 0
COUGH: 0
NECK PAIN: 0
SEIZURES: 0
NERVOUS/ANXIOUS: 0
SPEECH CHANGE: 0
STRIDOR: 0
DOUBLE VISION: 0
HALLUCINATIONS: 0
DIAPHORESIS: 0
CHILLS: 0
NAUSEA: 0
ABDOMINAL PAIN: 0

## 2019-07-26 ASSESSMENT — LIFESTYLE VARIABLES: SUBSTANCE_ABUSE: 0

## 2019-07-26 NOTE — PROGRESS NOTES
Lab called with critical result of K at 2.7 . Critical lab result read back .   GIL Wilson notified of critical result.  K scale orders followed.

## 2019-07-26 NOTE — PROCEDURES
Date of service:  7/25/2019    Title:  Central venous catheter placement - internal jugular vein    Indication:  Critical hypokalemia.  Needs central venous access for IV potassium supplementation.    Narrative:    The right neck was prepped with chlorhexidine and draped in the usual sterile fashion.  1% Xylocaine solution was used for topical anesthesia.  A triple lumen central venous catheter was placed into the right internal jugular vein under ultrasound guidance using the technique described by Hugo without difficulty or apparent complication.  The line was sutured into place and a sterile dressing was placed over the line.  All 3 ports flush and return venous blood easily.  The patient tolerated the procedure quite nicely.  No complications are apparent.  A STAT CXR is ordered to confirm placement.      Senthil Hernandez MD  Pulmonary and Critical Care Medicine

## 2019-07-26 NOTE — ASSESSMENT & PLAN NOTE
He presented with critical, life-threatening hypokalemia with associated paralysis of his arms and legs  Resolved with aggressive IV potassium supplementation  The cause of his hypokalemia was severe diarrhea with nausea and vomiting

## 2019-07-26 NOTE — ASSESSMENT & PLAN NOTE
He developed paralysis of his arms and legs due to profound hypokalemia  His weakness is markedly improved today after aggressive IV supplementation of potassium

## 2019-07-26 NOTE — DISCHARGE PLANNING
Care Transition Team Assessment     RN HUMBERTO met with patient at bedside. Patient lives with spouse and independent with all ADL's prior to admit. Patient currently does not have insurance (PFA emailed) but has been working for [employer] for 9 months now and plans to get coverage through that employer when able. Anticipate no needs upon discharge.    Information Source  Orientation : Oriented x 4  Information Given By:  (patient chart)  Informant's Name:     Readmission Evaluation  Is this a readmission?: No    Elopement Risk  Legal Hold: No  Ambulatory or Self Mobile in Wheelchair: No-Not an Elopement Risk  Elopement Risk: Not at Risk for Elopement    Interdisciplinary Discharge Planning  Does Admitting Nurse Feel This Could be a Complex Discharge?: No  Primary Care Physician: none  Lives with - Patient's Self Care Capacity: Spouse, Adult Children  Patient or legal guardian wants to designate a caregiver (see row info): No  Support Systems: Spouse / Significant Other, Family Member(s)  Housing / Facility: 2 Story Apartment / Condo  Able to Return to Previous ADL's: Yes  Mobility Issues: Yes  Prior Services: None  Patient Expects to be Discharged to:: home  Assistance Needed: No  Durable Medical Equipment: Not Applicable    Discharge Preparedness  What is your plan after discharge?: Home with help  What are your discharge supports?: Spouse  Prior Functional Level: Ambulatory, Independent with Activities of Daily Living, Independent with Medication Management  Difficulity with ADLs: None  Difficulity with IADLs: None    Functional Assesment  Prior Functional Level: Ambulatory, Independent with Activities of Daily Living, Independent with Medication Management     Vision / Hearing Impairment  Vision Impairment : No  Hearing Impairment : No     Domestic Abuse  Have you ever been the victim of abuse or violence?: No  Physical Abuse or Sexual Abuse: No  Verbal Abuse or Emotional Abuse: No    Psychological  Assessment  History of Substance Abuse: None  History of Psychiatric Problems: No  Non-compliant with Treatment: No    Discharge Risks or Barriers  Discharge risks or barriers?: No PCP, Uninsured / underinsured    Anticipated Discharge Information  Anticipated discharge disposition: Home  Discharge Address: [facility] [address] [place] [address]  Discharge Contact Phone Number: [phone]

## 2019-07-26 NOTE — CARE PLAN
Problem: Venous Thromboembolism (VTW)/Deep Vein Thrombosis (DVT) Prevention:  Goal: Patient will participate in Venous Thrombosis (VTE)/Deep Vein Thrombosis (DVT)Prevention Measures  scd's in place.     Problem: Psychosocial Needs:  Goal: Level of anxiety will decrease  Improved with increased extremity movement.

## 2019-07-26 NOTE — CONSULTS
"PULMONARY AND CRITICAL CARE MEDICINE CONSULTATION    Date of Consultation:  7/25/2019    Requesting Physician:  David Low MD    Consulting Physician:  Senthil Hernandez MD    Reason for Consultation: Critical care management in gentleman with life-threatening hypokalemia    Chief Complaint: Weakness, nausea, vomiting and diarrhea    History of Present Illness:    I was kindly asked to see and evaluate Francisco Mahoney, a 53 y.o. male for evaluation and management of the above problem.    This gentleman has no past medical history.  He was well until yesterday.  Yesterday afternoon he consumed a \"coffee\" beverage which came in some type of packet.  Approximately 15 minutes after ingesting it, he developed severe nausea and vomiting.  He has had multiple bouts of watery diarrhea.  He denies hematemesis, coffee-ground emesis, melena or hematochezia.  He is developed progressive weakness and is having focal weakness in his legs and arms with tingling.  At the time of my initial evaluation he was completely unable to move his arms or legs.  He was able to shrug his shoulders.  He denies any headache, blurred vision, photophobia or diplopia.  He denies any fever, chills or sweats.    Medications Prior to Admission:    No current facility-administered medications on file prior to encounter.      No current outpatient prescriptions on file prior to encounter.       Current Medications:      Current Facility-Administered Medications:   •  acetaminophen (TYLENOL) tablet 650 mg, 650 mg, Oral, Q6HRS PRN, David Low M.D.  •  Notify provider if pain remains uncontrolled, , , CONTINUOUS **AND** Use the numeric rating scale (NRS-11) on regular floors and Critical-Care Pain Observation Tool (CPOT) on ICUs/Trauma to assess pain, , , CONTINUOUS **AND** Pulse Ox (Oximetry), , , CONTINUOUS **AND** [DISCONTINUED] Pharmacy Consult Request ...Pain Management Review 1 Each, 1 Each, Other, PHARMACY TO DOSE **AND** If patient " difficult to arouse and/or has respiratory depression, stop any opiates that are currently infusing and call a Rapid Response., , , CONTINUOUS **AND** oxyCODONE immediate-release (ROXICODONE) tablet 2.5 mg, 2.5 mg, Oral, Q3HRS PRN **AND** oxyCODONE immediate-release (ROXICODONE) tablet 5 mg, 5 mg, Oral, Q3HRS PRN **AND** morphine (pf) 4 mg/ml injection 2 mg, 2 mg, Intravenous, Q3HRS PRN, David Low M.D.  •  cloNIDine (CATAPRES) tablet 0.1 mg, 0.1 mg, Oral, Q6HRS PRN, David Low M.D.  •  ondansetron (ZOFRAN) syringe/vial injection 4 mg, 4 mg, Intravenous, Q4HRS PRN, David Low M.D.  •  ondansetron (ZOFRAN ODT) dispertab 4 mg, 4 mg, Oral, Q4HRS PRN, David Low M.D.  •  promethazine (PHENERGAN) tablet 12.5-25 mg, 12.5-25 mg, Oral, Q4HRS PRN, David Low M.D.  •  promethazine (PHENERGAN) suppository 12.5-25 mg, 12.5-25 mg, Rectal, Q4HRS PRN, David Low M.D.  •  prochlorperazine (COMPAZINE) injection 5-10 mg, 5-10 mg, Intravenous, Q4HRS PRN, David Low M.D.  •  enoxaparin (LOVENOX) inj 40 mg, 40 mg, Subcutaneous, DAILY, David Low M.D., 40 mg at 07/25/19 0720  •  K+ Scale: Goal of 5, 1 Each, Intravenous, Q6HRS, Senthil Hernandez M.D.  •  potassium phosphates 15 mmol in D5W 500 mL ivpb, 15 mmol, Intravenous, Once, Senthil Hernandez M.D.  •  lactated ringers infusion, , Intravenous, Continuous, Senthil Hernandez M.D.    Allergies:    Patient has no known allergies.    Past Surgical History:    History reviewed. No pertinent surgical history.    Past Medical History:    History reviewed. No pertinent past medical history.    Social History:    Social History     Social History   • Marital status:      Spouse name: N/A   • Number of children: N/A   • Years of education: N/A     Occupational History   • Not on file.     Social History Main Topics   • Smoking status: Never Smoker   • Smokeless tobacco: Never Used   • Alcohol use No   • Drug use: No   • Sexual  "activity: Not on file     Other Topics Concern   • Not on file     Social History Narrative   • No narrative on file       Family History:    Family History   Problem Relation Age of Onset   • No Known Problems Mother    • No Known Problems Father        Review of System:    Review of Systems   Constitutional: Negative for chills, diaphoresis and fever.   HENT: Negative for ear pain and nosebleeds.    Eyes: Negative for blurred vision, double vision and photophobia.   Respiratory: Positive for shortness of breath. Negative for cough, hemoptysis and stridor.    Cardiovascular: Negative for chest pain, palpitations and leg swelling.   Gastrointestinal: Positive for diarrhea, nausea and vomiting. Negative for blood in stool and melena.   Genitourinary: Negative for dysuria, hematuria and urgency.   Musculoskeletal: Negative for back pain, myalgias and neck pain.   Skin: Negative for rash.   Neurological: Positive for sensory change and focal weakness. Negative for seizures, loss of consciousness and headaches.   Endo/Heme/Allergies: Does not bruise/bleed easily.   Psychiatric/Behavioral: Negative for hallucinations, substance abuse and suicidal ideas. The patient is not nervous/anxious.        Physical Examination:    BP (!) 167/70 Comment: RN notified  Pulse (!) 43   Temp 36.7 °C (98 °F) (Temporal)   Resp 14   Ht 1.753 m (5' 9\")   Wt 78.6 kg (173 lb 4.5 oz)   SpO2 98%   BMI 25.59 kg/m²   Physical Exam   Constitutional: He is oriented to person, place, and time.   HENT:   Head: Normocephalic and atraumatic.   Right Ear: External ear normal.   Left Ear: External ear normal.   Nose: Nose normal.   Mouth/Throat: Oropharynx is clear and moist.   Eyes: Pupils are equal, round, and reactive to light. Conjunctivae are normal. Right eye exhibits no discharge. Left eye exhibits no discharge.   Neck: Normal range of motion. Neck supple. No JVD present. No tracheal deviation present.   Cardiovascular: Intact distal pulses. "  Exam reveals no gallop.    No murmur heard.  Sinus rhythm   Pulmonary/Chest: No stridor. He has no wheezes. He has no rales.   Abdominal: Soft. Bowel sounds are normal. He exhibits no distension. There is no tenderness. There is no rebound and no guarding.   Musculoskeletal: He exhibits no edema or tenderness.   No clubbing or cyanosis   Neurological: He is alert and oriented to person, place, and time. GCS score is 15.   He can shrug his shoulders.  He has no movement in his arms or legs.  He has intact sensation.   Skin: Skin is warm and dry. No rash noted. He is not diaphoretic. No erythema.       Laboratory Data:        Recent Labs      07/25/19   0407   WBC  8.3   RBC  5.74   HEMOGLOBIN  17.2   HEMATOCRIT  49.8   MCV  86.8   MCH  30.0   MCHC  34.5   RDW  39.8   PLATELETCT  240   MPV  10.0     Recent Labs      07/25/19   0407  07/25/19   0700  07/25/19   1148  07/25/19   1444   SODIUM  140   --    --    --    POTASSIUM  2.5*  2.3*  1.9*  1.8*   CHLORIDE  105   --    --    --    CO2  23   --    --    --    GLUCOSE  130*   --    --    --    BUN  15   --    --    --    CREATININE  0.94   --    --    --    CALCIUM  10.0   --    --    --              Recent Labs      07/25/19   0930  07/25/19   1628   METHADONE  Negative  Negative   OPIATES  Negative  Negative   CANNABINOID  Negative  Negative   AMPHUR  Negative  Negative       Imaging:    I personally viewed the CXR and CT scan images as well as reviewed the radiology interpretation reports.    DX-CHEST-PORTABLE (1 VIEW)   Final Result         Right central venous catheter with tip projecting over the expected area of the lower SVC.      CT-HEAD W/O   Final Result         1. No acute intracranial abnormality. No evidence of acute intracranial hemorrhage or mass lesion.                   Assessment and Plan:    * Hypokalemia   Assessment & Plan    Critical, life-threatening hypokalemia with associated paralysis of the arms and legs  High risk of developing  "progressive paralysis with subsequent respiratory failure and need for intubation and mechanical ventilatory support  Close observation in the ICU  Monitor serum potassium every 6 hours  Aggressive IV potassium replacement via central venous catheter  Etiology of the hypokalemia is profound diarrhea with nausea and vomiting     Diarrhea   Assessment & Plan    This followed the ingestion of some type of \"coffee\" beverage  He has had no diarrhea since presentation to the hospital  No leukocytosis or fever  I doubt that he has an infectious diarrhea  Check stool leukocytes and stool for pathogens  Hold on antibiotics for now     Vomiting   Assessment & Plan    Following ingestion of some type of \"coffee\" beverage  He is afebrile  He does not have leukocytosis  His abdominal exam is unremarkable  Antiemetics as necessary     Hypophosphatemia   Assessment & Plan    Replete phosphorus       This gentleman is critically ill in ICU with severe hypokalemia.  He has associated paralysis of his legs and arms.  At this time, he has no respiratory muscle weakness or paralysis.  He is at high risk for developing progressive muscle weakness with the development of respiratory failure and the need for intubation and mechanical ventilatory support.  He requires very close observation in the ICU with continuous electrocardiographic monitoring.  A central venous catheter has been placed and I have initiated aggressive IV potassium replacement.  I have assessed and reassessed his respiratory status, blood pressure, cardiovascular status, hemodynamics and neurologic status.  He is at high risk for developing worsening respiratory, cardiovascular and nervous system dysfunction.    Addendum:    I have performed multiple serial reassessments.  His potassium is now up to 2.7 from 1.8.  His CPK is mildly elevated at 227.  Urine drug screen negative.  He has improved movement in his upper extremities.  He is now able to move his hands and " fingers and flex his wrists.  He is beginning to show some mild movement in his toes.  Continue current plan of aggressive potassium replacement with very close ICU monitoring    High risk of deterioration and worsening vital organ dysfunction and death without the above critical care interventions.    Thank you for allowing me to participate in the care of this gentleman.  I will continue to follow him with great interest.    Critical Care Time:  100 minutes  [id number]  No time overlap  Time excludes procedures  Discussed with RN, RT, hospitalist, clinical pharmacist    Senthil Hernandez MD  Pulmonary and Critical Care Medicine

## 2019-07-26 NOTE — PROGRESS NOTES
2 RN skin check complete.   Devices in place scd's, blood pressure cuff, ekg leads.  Skin assessed under devices .  Confirmed pressure ulcers found on, none.  New potential pressure ulcers noted on, none. Wound consult placed , no.  The following interventions in place pillows for support and positioning to arms and to float heels. Blood pressure cuff repositioned, slight blanching redness noted. Healing left flank abrasion.

## 2019-07-26 NOTE — PROGRESS NOTES
Transferred patient from T8 to S133. Received report at 1445, went to CT and arrive on the unit at 1545.Deepa (spouse) at bedside on telemetry unit. Dr. Naidu updated on transfer.

## 2019-07-26 NOTE — PROGRESS NOTES
Family brought in a packet of [employer] that the patient took at home thinking it was coffee.

## 2019-07-26 NOTE — PROGRESS NOTES
d from Lab called with critical result of Potassium 1.8 at 1530. Critical lab result read back to d.   Dr. Patrick notified of critical lab result at 1531.  Critical lab result read back by Dr. Peoples.

## 2019-07-26 NOTE — CARE PLAN
Problem: Venous Thromboembolism (VTW)/Deep Vein Thrombosis (DVT) Prevention:  Goal: Patient will participate in Venous Thrombosis (VTE)/Deep Vein Thrombosis (DVT)Prevention Measures  Outcome: PROGRESSING AS EXPECTED  SCDs in place. Lovenox administered per MAR.    Problem: Urinary Elimination:  Goal: Ability to reestablish a normal urinary elimination pattern will improve  Outcome: PROGRESSING SLOWER THAN EXPECTED  [place] catheter placed for urinary retention after bladder scan obtained. Catheter care provided with soap and water.

## 2019-07-26 NOTE — ASSESSMENT & PLAN NOTE
"Following ingestion of some type of \"coffee\" beverage  Resolved  His abdominal exam is entirely benign  He is afebrile and does not have leukocytosis  "

## 2019-07-26 NOTE — PROGRESS NOTES
Critical Care Progress Note    Date of admission  7/25/2019    Chief Complaint  53 y.o. male admitted 7/25/2019 with nausea, vomiting, diarrhea and weakness.    Hospital Course    This gentleman was admitted to the ICU with life-threatening hypokalemia and associated weakness.      Interval Problem Update  Reviewed last 24 hour events:      0830 hours:    Now moving all 4  SB-SR  Afebrile  RA  Replete Mg and PO4    K scale      He feels much better today.  His weakness in his arms and legs is markedly improved.  He denies any dyspnea.  He has no cough, sputum production or hemoptysis.  He is hungry and would like to eat.  He denies abdominal pain, nausea or vomiting.  He has not had any episodes of diarrhea since he was admitted.  He has no dysuria, urgency, frequency or hematuria.      Review of Systems  Review of Systems   Constitutional: Negative for chills, diaphoresis and fever.   HENT: Negative for ear discharge, ear pain and nosebleeds.    Eyes: Negative for blurred vision, double vision and photophobia.   Respiratory: Negative for cough, hemoptysis, shortness of breath and stridor.    Cardiovascular: Negative for chest pain, palpitations and leg swelling.   Gastrointestinal: Negative for abdominal pain, blood in stool, nausea and vomiting.   Genitourinary: Negative for dysuria, hematuria and urgency.   Musculoskeletal: Negative for joint pain, myalgias and neck pain.   Skin: Negative for rash.   Neurological: Negative for speech change, seizures, loss of consciousness and headaches.   Endo/Heme/Allergies: Does not bruise/bleed easily.   Psychiatric/Behavioral: Negative for hallucinations, substance abuse and suicidal ideas. The patient is not nervous/anxious.         Vital Signs for last 24 hours   Temp:  [36.2 °C (97.2 °F)-37.3 °C (99.1 °F)] 37.3 °C (99.1 °F)  Pulse:  [43-85] 59  Resp:  [8-30] 25  BP: (154-167)/(70-89) 167/70  SpO2:  [93 %-98 %] 98 %    Hemodynamic parameters for last 24 hours        Respiratory Information for the last 24 hours       Physical Exam   Physical Exam   Constitutional: He is oriented to person, place, and time. He appears well-developed. No distress.   HENT:   Head: Normocephalic and atraumatic.   Right Ear: External ear normal.   Left Ear: External ear normal.   Nose: Nose normal.   Mouth/Throat: Oropharynx is clear and moist.   Eyes: Pupils are equal, round, and reactive to light. Conjunctivae are normal. Right eye exhibits no discharge. Left eye exhibits no discharge.   Neck: Normal range of motion. Neck supple. No JVD present. No tracheal deviation present.   Cardiovascular: Intact distal pulses.  Exam reveals no gallop.    No murmur heard.  Sinus rhythm   Pulmonary/Chest: Effort normal. No stridor. No respiratory distress. He has no wheezes. He has no rales.   Abdominal: Soft. Bowel sounds are normal. He exhibits no distension. There is no tenderness. There is no rebound.   Musculoskeletal: Normal range of motion. He exhibits no edema or tenderness.   No clubbing or cyanosis   Neurological: He is alert and oriented to person, place, and time. No cranial nerve deficit. Coordination normal.   There is now only mild weakness in the arms and the legs.  He is markedly improved.   Skin: Skin is warm and dry. No rash noted. He is not diaphoretic. No erythema.   Psychiatric: He has a normal mood and affect. His behavior is normal. Thought content normal.       Medications  Current Facility-Administered Medications   Medication Dose Route Frequency Provider Last Rate Last Dose   • potassium chloride in water (KCL) ivpb **Administer in ICU only** 20 mEq  20 mEq Intravenous Once Senthil Hernandez M.D. 50 mL/hr at 07/26/19 0332 20 mEq at 07/26/19 0332   • acetaminophen (TYLENOL) tablet 650 mg  650 mg Oral Q6HRS PRN David Low M.D.       • oxyCODONE immediate-release (ROXICODONE) tablet 2.5 mg  2.5 mg Oral Q3HRS PRN David Low M.D.        And   • oxyCODONE  immediate-release (ROXICODONE) tablet 5 mg  5 mg Oral Q3HRS PRN David Low M.D.        And   • morphine (pf) 4 mg/ml injection 2 mg  2 mg Intravenous Q3HRS PRN David Low M.D.       • cloNIDine (CATAPRES) tablet 0.1 mg  0.1 mg Oral Q6HRS PRN David Low M.D.       • ondansetron (ZOFRAN) syringe/vial injection 4 mg  4 mg Intravenous Q4HRS PRN David Low M.D.       • ondansetron (ZOFRAN ODT) dispertab 4 mg  4 mg Oral Q4HRS PRN David Low M.D.       • promethazine (PHENERGAN) tablet 12.5-25 mg  12.5-25 mg Oral Q4HRS PRN David Low M.D.       • promethazine (PHENERGAN) suppository 12.5-25 mg  12.5-25 mg Rectal Q4HRS PRN David Low M.D.       • prochlorperazine (COMPAZINE) injection 5-10 mg  5-10 mg Intravenous Q4HRS PRN David Low M.D.       • enoxaparin (LOVENOX) inj 40 mg  40 mg Subcutaneous DAILY David Low M.D.   40 mg at 07/25/19 0720   • K+ Scale: Goal of 5  1 Each Intravenous Q6HRS Senthil Hernandez M.D.   1 Each at 07/26/19 0321   • lactated ringers infusion   Intravenous Continuous Senthil Hernandez M.D. 100 mL/hr at 07/25/19 2135         Fluids    Intake/Output Summary (Last 24 hours) at 07/26/19 0428  Last data filed at 07/26/19 0200   Gross per 24 hour   Intake           3635.1 ml   Output             1235 ml   Net           2400.1 ml       Laboratory      Recent Labs      07/25/19 1910   CPKTOTAL  227*     Recent Labs      07/25/19   0407   07/25/19   1444  07/25/19 1910 07/26/19   0150   SODIUM  140   --    --    --    --    POTASSIUM  2.5*   < >  1.8*  2.7*  4.2   CHLORIDE  105   --    --    --    --    CO2  23   --    --    --    --    BUN  15   --    --    --    --    CREATININE  0.94   --    --    --    --    MAGNESIUM  2.3   --   2.0   --    --    PHOSPHORUS  2.2*   --    --    --    --    CALCIUM  10.0   --    --    --    --     < > = values in this interval not displayed.     Recent Labs      07/25/19   0407   ALTSGPT  33   ASTSGOT  29  "  ALKPHOSPHAT  81   TBILIRUBIN  0.7   LIPASE  21   GLUCOSE  130*     Recent Labs      07/25/19   0407   WBC  8.3   NEUTSPOLYS  88.10*   LYMPHOCYTES  8.30*   MONOCYTES  3.30   EOSINOPHILS  0.00   BASOPHILS  0.20   ASTSGOT  29   ALTSGPT  33   ALKPHOSPHAT  81   TBILIRUBIN  0.7     Recent Labs      07/25/19   0407   RBC  5.74   HEMOGLOBIN  17.2   HEMATOCRIT  49.8   PLATELETCT  240       Imaging  None    Assessment/Plan  * Hypokalemia   Assessment & Plan    He presented with critical, life-threatening hypokalemia with associated paralysis of his arms and legs  Resolved with aggressive IV potassium supplementation  The cause of his hypokalemia was severe diarrhea with nausea and vomiting     Weakness   Assessment & Plan    He developed paralysis of his arms and legs due to profound hypokalemia  His weakness is markedly improved today after aggressive IV supplementation of potassium     Diarrhea   Assessment & Plan    This followed the ingestion of some type of \"coffee\" beverage  Resolved  Benign abdominal exam     Vomiting   Assessment & Plan    Following ingestion of some type of \"coffee\" beverage  Resolved  His abdominal exam is entirely benign  He is afebrile and does not have leukocytosis     Hypophosphatemia   Assessment & Plan    Replete phosphorus          VTE:  Lovenox  Ulcer: Not Indicated  Lines: Central Line  Ongoing indication addressed    I have performed a physical exam and reviewed and updated ROS and Plan today (7/26/2019). In review of yesterday's note (7/25/2019), there are no changes except as documented above.     OK to transfer out of ICU.  [facility] will sign off.  Please call if you have any questions.    Discussed patient condition and risk of morbidity and/or mortality with Hospitalist, Family, RN, RT, Pharmacy, Charge nurse / hot rounds and QA team     Senthil Hernandez MD  Pulmonary and Critical Care Medicine    "

## 2019-07-26 NOTE — ASSESSMENT & PLAN NOTE
"This followed the ingestion of some type of \"coffee\" beverage  Resolved  Benign abdominal exam  "

## 2019-07-26 NOTE — PROGRESS NOTES
[facility] Medicine Daily Progress Note    Date of Service  7/25/2019    Chief Complaint  53 y.o. male admitted 7/25/2019 with hypokalemia and neurological symptoms      Interval Problem Update  Patient seen and examined today by myself, patient is lying in bed he is very concerned about his upper and lower extremity weakness patient is been asking for help to change his position since he is very weak and unable to move his upper and lower extremities, patient is states that the diarrhea and vomiting have resolved, patient denies any pain dizziness lightheadedness shortness of breath patient is alert oriented follows commands he is able to give good history.    Consultants/Specialty  Critical care    Code Status  Full code    Disposition  ICU    Review of Systems  Review of Systems   Constitutional: Negative for chills and fever.   HENT: Negative for congestion and sinus pain.    Eyes: Negative for blurred vision and double vision.   Respiratory: Negative for cough and hemoptysis.    Cardiovascular: Negative for chest pain, palpitations and orthopnea.   Gastrointestinal: Negative for heartburn, nausea and vomiting.   Genitourinary: Negative for dysuria and urgency.   Musculoskeletal: Negative for myalgias and neck pain.   Skin: Negative for itching.   Neurological: Positive for weakness. Negative for dizziness, tingling and headaches.   Endo/Heme/Allergies: Does not bruise/bleed easily.   Psychiatric/Behavioral: Negative for depression, substance abuse and suicidal ideas.        Physical Exam  Temp:  [36.2 °C (97.1 °F)-37 °C (98.6 °F)] 37 °C (98.6 °F)  Pulse:  [43-85] 57  Resp:  [8-28] 12  BP: (134-167)/(70-89) 167/70  SpO2:  [93 %-98 %] 97 %    Physical Exam   Constitutional: He is oriented to person, place, and time. He appears well-nourished. No distress.   HENT:   Head: Normocephalic and atraumatic.   Mouth/Throat: No oropharyngeal exudate.   Eyes: Conjunctivae are normal. Right eye exhibits no discharge. No  scleral icterus.   Neck: Normal range of motion. Neck supple. No JVD present.   Cardiovascular: Normal rate and regular rhythm.    Pulmonary/Chest: Effort normal and breath sounds normal. No stridor. No respiratory distress. He has no wheezes. He has no rales.   Abdominal: Soft. Bowel sounds are normal. He exhibits no distension. There is no tenderness. There is no rebound.   Musculoskeletal: Normal range of motion. He exhibits no edema.   Lymphadenopathy:     He has no cervical adenopathy.   Neurological: He is alert and oriented to person, place, and time. He displays abnormal reflex (Decreased). He exhibits abnormal muscle tone (Bilateral upper and lower extremity weakness symmetric).   Skin: Skin is dry. No erythema.   Psychiatric: He has a normal mood and affect. His behavior is normal.   Nursing note and vitals reviewed.      Fluids    Intake/Output Summary (Last 24 hours) at 07/25/19 2131  Last data filed at 07/25/19 2000   Gross per 24 hour   Intake          2446.93 ml   Output              910 ml   Net          1536.93 ml       Laboratory  Recent Labs      07/25/19   0407   WBC  8.3   RBC  5.74   HEMOGLOBIN  17.2   HEMATOCRIT  49.8   MCV  86.8   MCH  30.0   MCHC  34.5   RDW  39.8   PLATELETCT  240   MPV  10.0     Recent Labs      07/25/19   0407   07/25/19   1148  07/25/19   1444  07/25/19   1910   SODIUM  140   --    --    --    --    POTASSIUM  2.5*   < >  1.9*  1.8*  2.7*   CHLORIDE  105   --    --    --    --    CO2  23   --    --    --    --    GLUCOSE  130*   --    --    --    --    BUN  15   --    --    --    --    CREATININE  0.94   --    --    --    --    CALCIUM  10.0   --    --    --    --     < > = values in this interval not displayed.                   Imaging  DX-CHEST-PORTABLE (1 VIEW)   Final Result         Right central venous catheter with tip projecting over the expected area of the lower SVC.      CT-HEAD W/O   Final Result         1. No acute intracranial abnormality. No evidence of  acute intracranial hemorrhage or mass lesion.                    Assessment/Plan  * Hypokalemia   Assessment & Plan    Severe hypokalemia not responding to IV or oral supplementation, and also causing severe weakness bilateral upper and lower extremities, patient will require probably more aggressive potassium supplementation probably through central line and also close monitoring, patient is going to be transferred to ICU I have discussed case with ICU team hospitalist and critical care physician, I also discussed case with patient's nurse, discussed findings with patient family and patient.      Diarrhea   Assessment & Plan    Suspect due to ingestion of very strong coffee, apparently a mexican food product, diarrhea is resolved.  No history of blood in the stool or fevers or abdominal pain.     Vomiting   Assessment & Plan    Suspect due to coffee ingestion.    Resolved     Weakness   Assessment & Plan    Bilateral upper and lower extremities, as per patient weakness is ascending started on lower extremities now involving upper extremities due to history of diarrhea there is concern for possible Dare Barré syndrome although this most likely is related to his severe hypokalemia if weakness does not improve after correction of hypokalemia Dare Barré syndrome work-up will be needed possible lumbar function, patient is being transferred to ICU for close monitoring, CT head ordered and did not show any acute findings, I also ordered to repeat potassium levels every 4 hours, to check magnesium and CPK.     Hypophosphatemia   Assessment & Plan    Supplementing          VTE prophylaxis: Lovenox    > 60 minutes spent on this encounter by myself today evaluating patient reviewing patient chart review and imaging studies, EKG, blood work, electrolyte, ordering CT scan, repeated blood work, discussing case with patient, discussed the case with patient's family with critical care team nurse staff arranging ICU  transfer.

## 2019-07-26 NOTE — ASSESSMENT & PLAN NOTE
Bilateral upper and lower extremities, as per patient weakness is ascending started on lower extremities now involving upper extremities due to history of diarrhea there is concern for possible [place] Barré syndrome although this most likely is related to his severe hypokalemia if weakness does not improve after correction of hypokalemia [place] Barré syndrome work-up will be needed possible lumbar function, patient is being transferred to ICU for close monitoring, CT head ordered and did not show any acute findings, I also ordered to repeat potassium levels every 4 hours, to check magnesium and CPK.

## 2019-07-27 ENCOUNTER — PATIENT OUTREACH (OUTPATIENT)
Dept: HEALTH INFORMATION MANAGEMENT | Facility: OTHER | Age: 54
End: 2019-07-27

## 2019-07-27 VITALS
HEIGHT: 69 IN | SYSTOLIC BLOOD PRESSURE: 103 MMHG | WEIGHT: 175.04 LBS | OXYGEN SATURATION: 95 % | DIASTOLIC BLOOD PRESSURE: 57 MMHG | TEMPERATURE: 98.9 F | HEART RATE: 64 BPM | BODY MASS INDEX: 25.93 KG/M2 | RESPIRATION RATE: 16 BRPM

## 2019-07-27 LAB
ANION GAP SERPL CALC-SCNC: 5 MMOL/L (ref 0–11.9)
BASOPHILS # BLD AUTO: 0.5 % (ref 0–1.8)
BASOPHILS # BLD: 0.03 K/UL (ref 0–0.12)
BUN SERPL-MCNC: 13 MG/DL (ref 8–22)
CALCIUM SERPL-MCNC: 8.4 MG/DL (ref 8.5–10.5)
CHLORIDE SERPL-SCNC: 111 MMOL/L (ref 96–112)
CO2 SERPL-SCNC: 25 MMOL/L (ref 20–33)
CREAT SERPL-MCNC: 0.87 MG/DL (ref 0.5–1.4)
EOSINOPHIL # BLD AUTO: 0.09 K/UL (ref 0–0.51)
EOSINOPHIL NFR BLD: 1.6 % (ref 0–6.9)
ERYTHROCYTE [DISTWIDTH] IN BLOOD BY AUTOMATED COUNT: 43.2 FL (ref 35.9–50)
GLUCOSE SERPL-MCNC: 100 MG/DL (ref 65–99)
HCT VFR BLD AUTO: 39.8 % (ref 42–52)
HGB BLD-MCNC: 13.9 G/DL (ref 14–18)
IMM GRANULOCYTES # BLD AUTO: 0.01 K/UL (ref 0–0.11)
IMM GRANULOCYTES NFR BLD AUTO: 0.2 % (ref 0–0.9)
LYMPHOCYTES # BLD AUTO: 1.92 K/UL (ref 1–4.8)
LYMPHOCYTES NFR BLD: 34 % (ref 22–41)
MAGNESIUM SERPL-MCNC: 2.2 MG/DL (ref 1.5–2.5)
MCH RBC QN AUTO: 30.8 PG (ref 27–33)
MCHC RBC AUTO-ENTMCNC: 34.9 G/DL (ref 33.7–35.3)
MCV RBC AUTO: 88.1 FL (ref 81.4–97.8)
MONOCYTES # BLD AUTO: 0.45 K/UL (ref 0–0.85)
MONOCYTES NFR BLD AUTO: 8 % (ref 0–13.4)
NEUTROPHILS # BLD AUTO: 3.14 K/UL (ref 1.82–7.42)
NEUTROPHILS NFR BLD: 55.7 % (ref 44–72)
NRBC # BLD AUTO: 0 K/UL
NRBC BLD-RTO: 0 /100 WBC
PHOSPHATE SERPL-MCNC: 3 MG/DL (ref 2.5–4.5)
PLATELET # BLD AUTO: 192 K/UL (ref 164–446)
PMV BLD AUTO: 10 FL (ref 9–12.9)
POTASSIUM SERPL-SCNC: 4.1 MMOL/L (ref 3.6–5.5)
RBC # BLD AUTO: 4.52 M/UL (ref 4.7–6.1)
SODIUM SERPL-SCNC: 141 MMOL/L (ref 135–145)
WBC # BLD AUTO: 5.6 K/UL (ref 4.8–10.8)

## 2019-07-27 PROCEDURE — 80048 BASIC METABOLIC PNL TOTAL CA: CPT

## 2019-07-27 PROCEDURE — 36415 COLL VENOUS BLD VENIPUNCTURE: CPT

## 2019-07-27 PROCEDURE — 84100 ASSAY OF PHOSPHORUS: CPT

## 2019-07-27 PROCEDURE — 83735 ASSAY OF MAGNESIUM: CPT

## 2019-07-27 PROCEDURE — 85025 COMPLETE CBC W/AUTO DIFF WBC: CPT

## 2019-07-27 PROCEDURE — 99239 HOSP IP/OBS DSCHRG MGMT >30: CPT | Performed by: HOSPITALIST

## 2019-07-27 NOTE — DISCHARGE INSTRUCTIONS
Discharge Instructions    Discharged to home by car with friend. Discharged via walking, hospital escort: Yes.  Special equipment needed: Not Applicable    Be sure to schedule a follow-up appointment with your primary care doctor or any specialists as instructed.     Discharge Plan:   Diet Plan: Discussed  Activity Level: Discussed  Smoking Cessation Offered: Patient Refused  Confirmed Follow up Appointment: Patient to Call and Schedule Appointment  Confirmed Symptoms Management: Discussed  Medication Reconciliation Updated: Yes  Influenza Vaccine Indication: Patient Refuses    I understand that a diet low in cholesterol, fat, and sodium is recommended for good health. Unless I have been given specific instructions below for another diet, I accept this instruction as my diet prescription.   Other diet: regular    Special Instructions:   Discharge Instructions per Roger Turner M.D.    F/u with PCP in 1 week  Repeat potassium levels in 3-5 days.    DIET: healthy diet    ACTIVITY: as tolerated    DIAGNOSIS: hypokalemia    Return to ER if weakness, nausea and vomiting, fever, etc,.      · Is patient discharged on Warfarin / Coumadin?   No       Hypokalemia  Hypokalemia means that the amount of potassium in the blood is lower than normal. Potassium is a chemical that helps regulate the amount of fluid in the body (electrolyte). It also stimulates muscle tightening (contraction) and helps nerves work properly. Normally, most of the body’s potassium is inside of cells, and only a very small amount is in the blood. Because the amount in the blood is so small, minor changes to potassium levels in the blood can be life-threatening.  What are the causes?  This condition may be caused by:  · Antibiotic medicine.  · Diarrhea or vomiting. Taking too much of a medicine that helps you have a bowel movement (laxative) can cause diarrhea and lead to hypokalemia.  · Chronic kidney disease (CKD).  · Medicines that help the  body get rid of excess fluid (diuretics).  · Eating disorders, such as bulimia.  · Low magnesium levels in the body.  · Sweating a lot.  What are the signs or symptoms?  Symptoms of this condition include:  · Weakness.  · Constipation.  · Fatigue.  · Muscle cramps.  · Mental confusion.  · Skipped heartbeats or irregular heartbeat (palpitations).  · Tingling or numbness.  How is this diagnosed?  This condition is diagnosed with a blood test.  How is this treated?  Hypokalemia can be treated by taking potassium supplements by mouth or adjusting the medicines that you take. Treatment may also include eating more foods that contain a lot of potassium. If your potassium level is very low, you may need to get potassium through an IV tube in one of your veins and be monitored in the hospital.  Follow these instructions at home:  · Take over-the-counter and prescription medicines only as told by your health care provider. This includes vitamins and supplements.  · Eat a healthy diet. A healthy diet includes fresh fruits and vegetables, whole grains, healthy fats, and lean proteins.  · If instructed, eat more foods that contain a lot of potassium, such as:  ¨ Nuts, such as peanuts and pistachios.  ¨ Seeds, such as sunflower seeds and pumpkin seeds.  ¨ Peas, lentils, and lima beans.  ¨ Whole grain and bran cereals and breads.  ¨ Fresh fruits and vegetables, such as apricots, avocado, bananas, cantaloupe, kiwi, oranges, tomatoes, asparagus, and potatoes.  ¨ Orange juice.  ¨ Tomato juice.  ¨ Red meats.  ¨ Yogurt.  · Keep all follow-up visits as told by your health care provider. This is important.  Contact a health care provider if:  · You have weakness that gets worse.  · You feel your heart pounding or racing.  · You vomit.  · You have diarrhea.  · You have diabetes (diabetes mellitus) and you have trouble keeping your blood sugar (glucose) in your target range.  Get help right away if:  · You have chest pain.  · You have  shortness of breath.  · You have vomiting or diarrhea that lasts for more than 2 days.  · You faint.  This information is not intended to replace advice given to you by your health care provider. Make sure you discuss any questions you have with your health care provider.  Document Released: 12/18/2006 Document Revised: 08/05/2017 Document Reviewed: 08/05/2017  [employer] Interactive Patient Education © 2017 [employer]    Hipokalemia  (Hypokalemia)  Hipokalemia significa que el nivel de potasio en moshe es caio que lo normal. El potasio es un electrolito que ayuda a regular la cantidad de líquido del organismo. También estimula la contracción muscular y ayuda a que la función muscular sea la adecuada. La mayoría del potasio del organismo se encuentra dentro de las células y sólo steven pequeña cantidad en la moshe. Debido a que la cantidad en la moshe es muy pequeña, pequeños cambios en la moshe pueden poner en peligro la ashish.  CAUSAS  · Antibióticos.  · Diarrea o vómitos.  · El uso excesivo de laxantes, lo que puede causar diarrea.  · Enfermedad renal crónica.  · Uso de diuréticos.  · Trastornos de la alimentación (bulimia).  · Bajos niveles de magnesio.  · Sudoración abundante.  SIGNOS Y SÍNTOMAS  · Debilidad.  · Estreñimiento.  · Fatiga.  · Calambres musculares.  · Confusión mental.  · Latidos cardíacos salteados o irregulares (palpitaciones).  · Hormigueo o adormecimiento.  DIAGNÓSTICO  El médico puede diagnosticar hipokalemia por los análisis de moshe. Además para controlar corinne niveles de potasio, el médico podrá ordenar otros análisis de laboratorio.  TRATAMIENTO  La hipokalemia puede tratarse con suplementos de potasio por vía oral o realizando ajustes en corinne medicamentos habituales. Si corinne niveles de potasio son muy bajos, será necesario que lo reciba a través de steven vena (IV) y se lo controle en el hospital. Steven dieta shirin en potasio también puede ser de ayuda. Los alimentos ricos en potasio  son:  · Brett secos, reese cacahuetes y pistachos.  · Semillas, reese semillas de girasol y de calabaza.  · Porotos, guisantes secos y lentejas.  · Granos enteros y panes y cereales con salvado.  · Frutas y vegetales frescos reese damascos, avocado, bananas, melón, kiwi, naranjas, espárragos y patatas.  · Jugos de naranja y tomates.  · Barrett lopez.  · Yogur con frutas.  INSTRUCCIONES PARA EL CUIDADO EN EL HOGAR  · [place] todos los medicamentos reese le indicó el médico.  · Siga steven dieta saludable e incluya alimentos nutritivos reese frutas, vegetales, nueces, granos enteros y barrett magras.  · Si está tomando laxantes, asegúrese de seguir las instrucciones del envase.  SOLICITE ATENCIÓN MÉDICA SI:  · La debilidad empeora.  · Siente que el corazón late atilio o está acelerado.  · Vomita o tiene diarrea.  · Tiene problemas para mantener shi nivel de glucosa en el rango normal.  SOLICITE ATENCIÓN MÉDICA DE INMEDIATO SI:  · Siente dolor en el pecho, le falta de aire o se siente mareado.  · Vomita o tiene diarrea gary más de 2 días.  · Se desmaya.  ASEGÚRESE DE QUE:  · Comprende estas instrucciones.  · Controlará shi afección.  · Recibirá ayuda de inmediato si no mejora o si empeora.  Esta información no tiene reese fin reemplazar el consejo del médico. Asegúrese de hacerle al médico cualquier pregunta que tenga.  Document Released: 12/18/2006 Document Revised: 01/08/2016 Document Reviewed: 06/28/2017  [employer]vier Interactive Patient Education © 2017 [employer]    Depression / Suicide Risk    As you are discharged from this [facility] facility, it is important to learn how to keep safe from harming yourself.    Recognize the warning signs:  · Abrupt changes in personality, positive or negative- including increase in energy   · Giving away possessions  · Change in eating patterns- significant weight changes-  positive or negative  · Change in sleeping patterns- unable to sleep or sleeping all the time   · Unwillingness or  inability to communicate  · Depression  · Unusual sadness, discouragement and loneliness  · Talk of wanting to die  · Neglect of personal appearance   · Rebelliousness- reckless behavior  · Withdrawal from people/activities they love  · Confusion- inability to concentrate     If you or a loved one observes any of these behaviors or has concerns about self-harm, here's what you can do:  · Talk about it- your feelings and reasons for harming yourself  · Remove any means that you might use to hurt yourself (examples: pills, rope, extension cords, firearm)  · Get professional help from the community (Mental Health, Substance Abuse, psychological counseling)  · Do not be alone:Call your Safe Contact- someone whom you trust who will be there for you.  · Call your local CRISIS HOTLINE 347-6329 or 397-944-6759  · Call your local Children's Mobile Crisis Response Team [place] (612) 800-0694 or www.MemberPass  · Call the toll free National Suicide Prevention Hotlines   · National Suicide Prevention Lifeline 582-741-GRGS (1816)  · National Hope Line Network 800-SUICIDE (094-8218)

## 2019-07-27 NOTE — PROGRESS NOTES
Patient discharged from hospital to home. Tele monitor and IV removed with no signs or symptoms of bleeding or infection. Discussed prescriptions, discharge education, and symptom management/signs of worsening symptoms with patient. Patient verbalized understanding. Patient aware of follow-up appointment with PCP and requirement of getting potassium level drawn within 3-5 days. Patient stable and vitals are within normal limits. Transported with home via wife.

## 2019-07-27 NOTE — CARE PLAN
Problem: Safety  Goal: Will remain free from injury  Outcome: PROGRESSING AS EXPECTED      Problem: Discharge Barriers/Planning  Goal: Patient's continuum of care needs will be met  Outcome: PROGRESSING AS EXPECTED  Discussed POC r/t expectations of medical management prior to discharge. Pt verbalized understanding.

## 2019-07-28 NOTE — DISCHARGE SUMMARY
Discharge Summary    CHIEF COMPLAINT ON ADMISSION  Chief Complaint   Patient presents with   • Nausea/Vomiting/Diarrhea     drank some instant coffe last night around 2100 and began to have N/V/D within an hour, says he has had multiple episodes of each all night and now is having abd pain       Reason for Admission  vomitting; diarreah     Admission Date  7/25/2019    CODE STATUS  Full code    HPI & HOSPITAL COURSE  Please see original H&P and consult note for specific information, patient was admitted due to nausea vomiting diarrhea and severe hypokalemia, patient was first admitted to telemetry where he was started on IV fluids and oral potassium and IV potassium, patient developed severe bilateral upper and lower extremities weakness with profound hypokalemia for this patient had to be transferred to ICU where central line was placed and potassium was replaced, patient gradually improved his potassium is back to normal he is feeling much better weakness have resolved he is alert oriented follows commands he is able to move bilateral upper and lower extremity he has been ambulating tolerating diet, and is going to be discharged home today he will follow-up with primary care physician, patient agreed with his discharge plan, all questions answered, patient will need repeat potassium levels in 3 to 5 days.       Therefore, he is discharged in good and stable condition to home with close outpatient follow-up.    The patient met 2-midnight criteria for an inpatient stay at the time of discharge.    Discharge Date  7/27/2019    FOLLOW UP ITEMS POST DISCHARGE  Primary care physician  LILA in 3 to 5 days    DISCHARGE DIAGNOSES  Principal Problem:    Hypokalemia POA: Unknown  Active Problems:    Vomiting POA: Unknown    Diarrhea POA: Unknown    Weakness POA: Unknown    Hypophosphatemia POA: Unknown  Resolved Problems:    * No resolved hospital problems. *      FOLLOW UP  No future appointments.  [facility]  [address] [place] [id number] [phone]    Please call [facility] to schedule an appointment with a primary care provider at a discounted rate.Thank you       MEDICATIONS ON DISCHARGE     Medication List      You have not been prescribed any medications.         Allergies  No Known Allergies    DIET  Healthy diet    ACTIVITY  As tolerated.  Weight bearing as tolerated    CONSULTATIONS  Pulmonary critical care    PROCEDURES  None    LABORATORY  Lab Results   Component Value Date    SODIUM 141 07/27/2019    POTASSIUM 4.1 07/27/2019    CHLORIDE 111 07/27/2019    CO2 25 07/27/2019    GLUCOSE 100 (H) 07/27/2019    BUN 13 07/27/2019    CREATININE 0.87 07/27/2019        Lab Results   Component Value Date    WBC 5.6 07/27/2019    HEMOGLOBIN 13.9 (L) 07/27/2019    HEMATOCRIT 39.8 (L) 07/27/2019    PLATELETCT 192 07/27/2019        Total time of the discharge process exceeds 35 minutes.

## 2025-06-19 NOTE — PROGRESS NOTES
Assumed care of patient and bedside report received from previous RN. Plan of care discussed with patient. All concerns voiced at this time. Patient is alert and oriented x 4. VSS. Patient educated on importance of calling, bed controls on, bed locked and in lowest position, call light with patient. Patient is in rhythm: SR.    Discussed with patient try ice/heat Tylenol every 12 hours will send to physical therapy if that fails to improve consider orthopedic management assistance.